# Patient Record
Sex: FEMALE | Race: WHITE | NOT HISPANIC OR LATINO | Employment: UNEMPLOYED | URBAN - METROPOLITAN AREA
[De-identification: names, ages, dates, MRNs, and addresses within clinical notes are randomized per-mention and may not be internally consistent; named-entity substitution may affect disease eponyms.]

---

## 2017-10-18 ENCOUNTER — HISTORICAL (OUTPATIENT)
Dept: LAB | Facility: HOSPITAL | Age: 27
End: 2017-10-18

## 2017-12-01 ENCOUNTER — HISTORICAL (OUTPATIENT)
Dept: LAB | Facility: HOSPITAL | Age: 27
End: 2017-12-01

## 2018-01-03 ENCOUNTER — HISTORICAL (OUTPATIENT)
Dept: LAB | Facility: HOSPITAL | Age: 28
End: 2018-01-03

## 2018-01-16 ENCOUNTER — HOSPITAL ENCOUNTER (OUTPATIENT)
Dept: OBSTETRICS AND GYNECOLOGY | Facility: HOSPITAL | Age: 28
End: 2018-01-16
Attending: OBSTETRICS & GYNECOLOGY | Admitting: OBSTETRICS & GYNECOLOGY

## 2018-02-19 ENCOUNTER — HISTORICAL (OUTPATIENT)
Dept: LAB | Facility: HOSPITAL | Age: 28
End: 2018-02-19

## 2018-03-14 ENCOUNTER — HISTORICAL (OUTPATIENT)
Dept: LAB | Facility: HOSPITAL | Age: 28
End: 2018-03-14

## 2018-04-25 ENCOUNTER — HISTORICAL (OUTPATIENT)
Dept: LAB | Facility: HOSPITAL | Age: 28
End: 2018-04-25

## 2018-04-26 ENCOUNTER — HISTORICAL (OUTPATIENT)
Dept: LAB | Facility: HOSPITAL | Age: 28
End: 2018-04-26

## 2018-05-04 ENCOUNTER — HOSPITAL ENCOUNTER (OUTPATIENT)
Dept: OBSTETRICS AND GYNECOLOGY | Facility: HOSPITAL | Age: 28
End: 2018-05-04
Attending: OBSTETRICS & GYNECOLOGY | Admitting: OBSTETRICS & GYNECOLOGY

## 2018-08-29 ENCOUNTER — HISTORICAL (OUTPATIENT)
Dept: LAB | Facility: HOSPITAL | Age: 28
End: 2018-08-29

## 2019-01-29 ENCOUNTER — HISTORICAL (OUTPATIENT)
Dept: LAB | Facility: HOSPITAL | Age: 29
End: 2019-01-29

## 2020-03-24 ENCOUNTER — HISTORICAL (OUTPATIENT)
Dept: ADMINISTRATIVE | Facility: HOSPITAL | Age: 30
End: 2020-03-24

## 2020-07-14 ENCOUNTER — HISTORICAL (OUTPATIENT)
Dept: OBSTETRICS AND GYNECOLOGY | Facility: HOSPITAL | Age: 30
End: 2020-07-14

## 2020-07-15 ENCOUNTER — HISTORICAL (OUTPATIENT)
Dept: OBSTETRICS AND GYNECOLOGY | Facility: HOSPITAL | Age: 30
End: 2020-07-15

## 2022-03-24 ENCOUNTER — HISTORICAL (OUTPATIENT)
Dept: ADMINISTRATIVE | Facility: HOSPITAL | Age: 32
End: 2022-03-24

## 2022-03-24 LAB
HBV SURFACE AG SERPL QL IA: NEGATIVE
HCV AB SERPL QL IA: NEGATIVE
HIV 1+2 AB+HIV1 P24 AG SERPL QL IA: NEGATIVE
RPR: NONREACTIVE
RUBELLA IMMUNE STATUS: NORMAL

## 2022-03-30 DIAGNOSIS — Z36.89 ENCOUNTER FOR FETAL ANATOMIC SURVEY: Primary | ICD-10-CM

## 2022-04-05 ENCOUNTER — PROCEDURE VISIT (OUTPATIENT)
Dept: MATERNAL FETAL MEDICINE | Facility: CLINIC | Age: 32
End: 2022-04-05
Payer: MEDICAID

## 2022-04-05 DIAGNOSIS — Z36.89 ENCOUNTER FOR FETAL ANATOMIC SURVEY: ICD-10-CM

## 2022-04-05 PROCEDURE — 76805 US MFM PROCEDURE (VIEWPOINT): ICD-10-PCS | Mod: S$GLB,,, | Performed by: OBSTETRICS & GYNECOLOGY

## 2022-04-05 PROCEDURE — 76805 OB US >/= 14 WKS SNGL FETUS: CPT | Mod: S$GLB,,, | Performed by: OBSTETRICS & GYNECOLOGY

## 2022-04-07 ENCOUNTER — HISTORICAL (OUTPATIENT)
Dept: ADMINISTRATIVE | Facility: HOSPITAL | Age: 32
End: 2022-04-07
Payer: MEDICAID

## 2022-04-24 VITALS
HEIGHT: 63 IN | DIASTOLIC BLOOD PRESSURE: 75 MMHG | BODY MASS INDEX: 22.79 KG/M2 | SYSTOLIC BLOOD PRESSURE: 113 MMHG | WEIGHT: 128.63 LBS

## 2022-04-27 DIAGNOSIS — Z36.89 ENCOUNTER FOR FETAL ANATOMIC SURVEY: Primary | ICD-10-CM

## 2022-05-10 ENCOUNTER — PROCEDURE VISIT (OUTPATIENT)
Dept: MATERNAL FETAL MEDICINE | Facility: CLINIC | Age: 32
End: 2022-05-10
Payer: MEDICAID

## 2022-05-10 DIAGNOSIS — Z36.89 ENCOUNTER FOR FETAL ANATOMIC SURVEY: ICD-10-CM

## 2022-05-10 PROCEDURE — 76816 US MFM PROCEDURE (VIEWPOINT): ICD-10-PCS | Mod: S$GLB,,, | Performed by: OBSTETRICS & GYNECOLOGY

## 2022-05-10 PROCEDURE — 76816 OB US FOLLOW-UP PER FETUS: CPT | Mod: S$GLB,,, | Performed by: OBSTETRICS & GYNECOLOGY

## 2022-05-10 PROCEDURE — 76819 US MFM PROCEDURE (VIEWPOINT): ICD-10-PCS | Mod: S$GLB,,, | Performed by: OBSTETRICS & GYNECOLOGY

## 2022-05-10 PROCEDURE — 76819 FETAL BIOPHYS PROFIL W/O NST: CPT | Mod: S$GLB,,, | Performed by: OBSTETRICS & GYNECOLOGY

## 2022-05-17 ENCOUNTER — OFFICE VISIT (OUTPATIENT)
Dept: FAMILY MEDICINE | Facility: CLINIC | Age: 32
End: 2022-05-17
Payer: MEDICAID

## 2022-05-17 DIAGNOSIS — F31.75 BIPOLAR 1 DISORDER, DEPRESSED, PARTIAL REMISSION: ICD-10-CM

## 2022-05-17 DIAGNOSIS — F41.1 GAD (GENERALIZED ANXIETY DISORDER): Primary | ICD-10-CM

## 2022-05-17 PROCEDURE — 99205 PR OFFICE/OUTPT VISIT, NEW, LEVL V, 60-74 MIN: ICD-10-PCS | Mod: 95,,, | Performed by: NURSE PRACTITIONER

## 2022-05-17 PROCEDURE — 99205 OFFICE O/P NEW HI 60 MIN: CPT | Mod: 95,,, | Performed by: NURSE PRACTITIONER

## 2022-05-17 NOTE — PATIENT INSTRUCTIONS
Sent patient education     Meds as directed  Keep all healthcare appointments  Utilize self-interventions from patient education materials  Nearest ER if overwhelmed   Clinic 4 weeks
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
## 2022-05-18 ENCOUNTER — HOSPITAL ENCOUNTER (INPATIENT)
Facility: HOSPITAL | Age: 32
LOS: 2 days | Discharge: HOME | End: 2022-05-20
Attending: OBSTETRICS & GYNECOLOGY | Admitting: OBSTETRICS & GYNECOLOGY
Payer: MEDICAID

## 2022-05-18 ENCOUNTER — ANESTHESIA EVENT (OUTPATIENT)
Dept: OBSTETRICS AND GYNECOLOGY | Facility: HOSPITAL | Age: 32
End: 2022-05-18
Payer: MEDICAID

## 2022-05-18 ENCOUNTER — ANESTHESIA (OUTPATIENT)
Dept: OBSTETRICS AND GYNECOLOGY | Facility: HOSPITAL | Age: 32
End: 2022-05-18
Payer: MEDICAID

## 2022-05-18 DIAGNOSIS — O47.00 PRETERM CONTRACTIONS: ICD-10-CM

## 2022-05-18 DIAGNOSIS — Z36.89 ENCOUNTER FOR ULTRASOUND TO ASSESS FETAL GROWTH: Primary | ICD-10-CM

## 2022-05-18 LAB
AMPHET UR QL SCN: NEGATIVE
BARBITURATE SCN PRESENT UR: NEGATIVE
BASOPHILS # BLD AUTO: 0.03 X10(3)/MCL (ref 0–0.2)
BASOPHILS NFR BLD AUTO: 0.3 %
BENZODIAZ UR QL SCN: NEGATIVE
CANNABINOIDS UR QL SCN: NEGATIVE
COCAINE UR QL SCN: NEGATIVE
EOSINOPHIL # BLD AUTO: 0.28 X10(3)/MCL (ref 0–0.9)
EOSINOPHIL NFR BLD AUTO: 2.4 %
ERYTHROCYTE [DISTWIDTH] IN BLOOD BY AUTOMATED COUNT: 13 % (ref 11.5–17)
FENTANYL UR QL SCN: NEGATIVE
HCT VFR BLD AUTO: 40.8 % (ref 37–47)
HGB BLD-MCNC: 13.2 GM/DL (ref 12–16)
IMM GRANULOCYTES # BLD AUTO: 0.07 X10(3)/MCL (ref 0–0.02)
IMM GRANULOCYTES NFR BLD AUTO: 0.6 % (ref 0–0.43)
LYMPHOCYTES # BLD AUTO: 3.05 X10(3)/MCL (ref 0.6–4.6)
LYMPHOCYTES NFR BLD AUTO: 26.1 %
MCH RBC QN AUTO: 30.6 PG (ref 27–31)
MCHC RBC AUTO-ENTMCNC: 32.4 MG/DL (ref 33–36)
MCV RBC AUTO: 94.7 FL (ref 80–94)
MDMA UR QL SCN: NEGATIVE
MONOCYTES # BLD AUTO: 0.55 X10(3)/MCL (ref 0.1–1.3)
MONOCYTES NFR BLD AUTO: 4.7 %
NEUTROPHILS # BLD AUTO: 7.7 X10(3)/MCL (ref 2.1–9.2)
NEUTROPHILS NFR BLD AUTO: 65.9 %
NRBC BLD AUTO-RTO: 0 %
OPIATES UR QL SCN: NEGATIVE
PCP UR QL: NEGATIVE
PH UR: 6 [PH] (ref 3–11)
PLATELET # BLD AUTO: 246 X10(3)/MCL (ref 130–400)
PMV BLD AUTO: 12.1 FL (ref 9.4–12.4)
PRENATAL STREP B CULTURE: NORMAL
RBC # BLD AUTO: 4.31 X10(6)/MCL (ref 4.2–5.4)
SPECIFIC GRAVITY, URINE AUTO (.000) (OHS): <1.005 (ref 1–1.03)
STREP B PCR (OHS): NOT DETECTED
T PALLIDUM AB SER QL: NONREACTIVE
T PALLIDUM AB SER QL: NORMAL
WBC # SPEC AUTO: 11.7 X10(3)/MCL (ref 4.5–11.5)

## 2022-05-18 PROCEDURE — 86850 RBC ANTIBODY SCREEN: CPT | Performed by: OBSTETRICS & GYNECOLOGY

## 2022-05-18 PROCEDURE — 37000009 HC ANESTHESIA EA ADD 15 MINS

## 2022-05-18 PROCEDURE — 72200004 HC VAGINAL DELIVERY LEVEL I

## 2022-05-18 PROCEDURE — 72100002 HC LABOR CARE, 1ST 8 HOURS

## 2022-05-18 PROCEDURE — 25000003 PHARM REV CODE 250: Performed by: OBSTETRICS & GYNECOLOGY

## 2022-05-18 PROCEDURE — 86780 TREPONEMA PALLIDUM: CPT | Performed by: OBSTETRICS & GYNECOLOGY

## 2022-05-18 PROCEDURE — 51702 INSERT TEMP BLADDER CATH: CPT

## 2022-05-18 PROCEDURE — 37000008 HC ANESTHESIA 1ST 15 MINUTES

## 2022-05-18 PROCEDURE — 63600175 PHARM REV CODE 636 W HCPCS: Performed by: OBSTETRICS & GYNECOLOGY

## 2022-05-18 PROCEDURE — 11000001 HC ACUTE MED/SURG PRIVATE ROOM

## 2022-05-18 PROCEDURE — 85025 COMPLETE CBC W/AUTO DIFF WBC: CPT | Performed by: OBSTETRICS & GYNECOLOGY

## 2022-05-18 PROCEDURE — 86900 BLOOD TYPING SEROLOGIC ABO: CPT | Performed by: OBSTETRICS & GYNECOLOGY

## 2022-05-18 PROCEDURE — 99285 EMERGENCY DEPT VISIT HI MDM: CPT | Mod: 25

## 2022-05-18 PROCEDURE — 36415 COLL VENOUS BLD VENIPUNCTURE: CPT | Performed by: OBSTETRICS & GYNECOLOGY

## 2022-05-18 PROCEDURE — 96372 THER/PROPH/DIAG INJ SC/IM: CPT | Performed by: OBSTETRICS & GYNECOLOGY

## 2022-05-18 PROCEDURE — 80307 DRUG TEST PRSMV CHEM ANLYZR: CPT | Performed by: OBSTETRICS & GYNECOLOGY

## 2022-05-18 PROCEDURE — 87653 STREP B DNA AMP PROBE: CPT | Performed by: OBSTETRICS & GYNECOLOGY

## 2022-05-18 PROCEDURE — 62326 NJX INTERLAMINAR LMBR/SAC: CPT | Performed by: ANESTHESIOLOGY

## 2022-05-18 PROCEDURE — 72200005 HC VAGINAL DELIVERY LEVEL II

## 2022-05-18 PROCEDURE — 25000003 PHARM REV CODE 250: Performed by: ANESTHESIOLOGY

## 2022-05-18 RX ORDER — FOLIC ACID 0.4 MG
0.4 TABLET ORAL
COMMUNITY
Start: 2022-03-24 | End: 2023-05-07

## 2022-05-18 RX ORDER — IBUPROFEN 600 MG/1
600 TABLET ORAL EVERY 6 HOURS PRN
Status: DISCONTINUED | OUTPATIENT
Start: 2022-05-19 | End: 2022-05-18

## 2022-05-18 RX ORDER — IBUPROFEN 600 MG/1
600 TABLET ORAL EVERY 6 HOURS
Status: DISCONTINUED | OUTPATIENT
Start: 2022-05-18 | End: 2022-05-20 | Stop reason: HOSPADM

## 2022-05-18 RX ORDER — SODIUM CHLORIDE, SODIUM LACTATE, POTASSIUM CHLORIDE, CALCIUM CHLORIDE 600; 310; 30; 20 MG/100ML; MG/100ML; MG/100ML; MG/100ML
INJECTION, SOLUTION INTRAVENOUS CONTINUOUS
Status: DISCONTINUED | OUTPATIENT
Start: 2022-05-18 | End: 2022-05-20 | Stop reason: HOSPADM

## 2022-05-18 RX ORDER — SODIUM CHLORIDE 0.9 % (FLUSH) 0.9 %
10 SYRINGE (ML) INJECTION
Status: DISCONTINUED | OUTPATIENT
Start: 2022-05-18 | End: 2022-05-20 | Stop reason: HOSPADM

## 2022-05-18 RX ORDER — PROCHLORPERAZINE EDISYLATE 5 MG/ML
5 INJECTION INTRAMUSCULAR; INTRAVENOUS EVERY 6 HOURS PRN
Status: DISCONTINUED | OUTPATIENT
Start: 2022-05-18 | End: 2022-05-20 | Stop reason: HOSPADM

## 2022-05-18 RX ORDER — MUPIROCIN 20 MG/G
OINTMENT TOPICAL
Status: CANCELLED | OUTPATIENT
Start: 2022-05-18

## 2022-05-18 RX ORDER — TERBUTALINE SULFATE 1 MG/ML
INJECTION SUBCUTANEOUS
Status: DISPENSED
Start: 2022-05-18 | End: 2022-05-19

## 2022-05-18 RX ORDER — LURASIDONE HYDROCHLORIDE 20 MG/1
20 TABLET, FILM COATED ORAL DAILY
COMMUNITY
Start: 2022-03-24

## 2022-05-18 RX ORDER — ONDANSETRON 2 MG/ML
4 INJECTION INTRAMUSCULAR; INTRAVENOUS EVERY 4 HOURS PRN
Status: DISCONTINUED | OUTPATIENT
Start: 2022-05-18 | End: 2022-05-20 | Stop reason: HOSPADM

## 2022-05-18 RX ORDER — BUPIVACAINE HYDROCHLORIDE 2.5 MG/ML
INJECTION, SOLUTION EPIDURAL; INFILTRATION; INTRACAUDAL
Status: DISPENSED
Start: 2022-05-18 | End: 2022-05-19

## 2022-05-18 RX ORDER — TERBUTALINE SULFATE 1 MG/ML
0.25 INJECTION SUBCUTANEOUS ONCE
Status: DISCONTINUED | OUTPATIENT
Start: 2022-05-18 | End: 2022-05-20 | Stop reason: HOSPADM

## 2022-05-18 RX ORDER — DIPHENOXYLATE HYDROCHLORIDE AND ATROPINE SULFATE 2.5; .025 MG/1; MG/1
1 TABLET ORAL 4 TIMES DAILY PRN
Status: DISCONTINUED | OUTPATIENT
Start: 2022-05-18 | End: 2022-05-20 | Stop reason: HOSPADM

## 2022-05-18 RX ORDER — ONDANSETRON 4 MG/1
8 TABLET, ORALLY DISINTEGRATING ORAL EVERY 8 HOURS PRN
Status: DISCONTINUED | OUTPATIENT
Start: 2022-05-18 | End: 2022-05-20 | Stop reason: HOSPADM

## 2022-05-18 RX ORDER — LIDOCAINE HYDROCHLORIDE 10 MG/ML
10 INJECTION INFILTRATION; PERINEURAL ONCE AS NEEDED
Status: DISCONTINUED | OUTPATIENT
Start: 2022-05-18 | End: 2022-05-20 | Stop reason: HOSPADM

## 2022-05-18 RX ORDER — PENICILLIN G 3000000 [IU]/50ML
3 INJECTION, SOLUTION INTRAVENOUS
Status: DISCONTINUED | OUTPATIENT
Start: 2022-05-18 | End: 2022-05-20 | Stop reason: HOSPADM

## 2022-05-18 RX ORDER — FENTANYL/BUPIVACAINE/NS/PF 2-1250MCG
PLASTIC BAG, INJECTION (ML) INJECTION CONTINUOUS PRN
Status: DISCONTINUED | OUTPATIENT
Start: 2022-05-18 | End: 2022-05-18

## 2022-05-18 RX ORDER — CARBOPROST TROMETHAMINE 250 UG/ML
250 INJECTION, SOLUTION INTRAMUSCULAR
Status: DISCONTINUED | OUTPATIENT
Start: 2022-05-18 | End: 2022-05-20 | Stop reason: HOSPADM

## 2022-05-18 RX ORDER — OXYTOCIN/RINGER'S LACTATE 30/500 ML
PLASTIC BAG, INJECTION (ML) INTRAVENOUS
Status: DISPENSED
Start: 2022-05-18 | End: 2022-05-19

## 2022-05-18 RX ORDER — OXYTOCIN/RINGER'S LACTATE 30/500 ML
334 PLASTIC BAG, INJECTION (ML) INTRAVENOUS ONCE
Status: COMPLETED | OUTPATIENT
Start: 2022-05-18 | End: 2022-05-18

## 2022-05-18 RX ORDER — MISOPROSTOL 100 UG/1
800 TABLET ORAL
Status: DISCONTINUED | OUTPATIENT
Start: 2022-05-18 | End: 2022-05-20 | Stop reason: HOSPADM

## 2022-05-18 RX ORDER — CALCIUM CARBONATE 200(500)MG
500 TABLET,CHEWABLE ORAL 3 TIMES DAILY PRN
Status: DISCONTINUED | OUTPATIENT
Start: 2022-05-18 | End: 2022-05-20 | Stop reason: HOSPADM

## 2022-05-18 RX ORDER — BETAMETHASONE SODIUM PHOSPHATE AND BETAMETHASONE ACETATE 3; 3 MG/ML; MG/ML
12 INJECTION, SUSPENSION INTRA-ARTICULAR; INTRALESIONAL; INTRAMUSCULAR; SOFT TISSUE
Status: COMPLETED | OUTPATIENT
Start: 2022-05-18 | End: 2022-05-18

## 2022-05-18 RX ORDER — OXYTOCIN/RINGER'S LACTATE 30/500 ML
95 PLASTIC BAG, INJECTION (ML) INTRAVENOUS ONCE
Status: COMPLETED | OUTPATIENT
Start: 2022-05-18 | End: 2022-05-18

## 2022-05-18 RX ORDER — SIMETHICONE 80 MG
1 TABLET,CHEWABLE ORAL 4 TIMES DAILY PRN
Status: DISCONTINUED | OUTPATIENT
Start: 2022-05-18 | End: 2022-05-20 | Stop reason: HOSPADM

## 2022-05-18 RX ORDER — METHYLERGONOVINE MALEATE 0.2 MG/ML
200 INJECTION INTRAVENOUS
Status: DISCONTINUED | OUTPATIENT
Start: 2022-05-18 | End: 2022-05-20 | Stop reason: HOSPADM

## 2022-05-18 RX ADMIN — DEXTROSE MONOHYDRATE 5 MILLION UNITS: 5 INJECTION INTRAVENOUS at 06:05

## 2022-05-18 RX ADMIN — BETAMETHASONE ACETATE AND BETAMETHASONE SODIUM PHOSPHATE 12 MG: 3; 3 INJECTION, SUSPENSION INTRA-ARTICULAR; INTRALESIONAL; INTRAMUSCULAR; SOFT TISSUE at 01:05

## 2022-05-18 RX ADMIN — Medication 10 ML/HR: at 07:05

## 2022-05-18 RX ADMIN — Medication 95 MILLI-UNITS/MIN: at 10:05

## 2022-05-18 RX ADMIN — Medication 334 MILLI-UNITS/MIN: at 10:05

## 2022-05-18 NOTE — ED PROVIDER NOTES
"CAROLYN NOTE     Admit Date: 2022  CAROLYN Physician: Martínez Chapman  Primary OBGYN:     Admit Diagnosis/Chief Complaint: Contractions      Chief Complaint   Patient presents with    Abdominal Pain       Hospital Course:  Barbara Mejia is a 31 y.o.  at 35w0d presents complaining of contractions  This IUP is complicated by history of  delivery, anxiety and bipolar    Patient denies vaginal bleeding and leakage of fluid.  Fetal Movement: normal.    /78 (BP Location: Left arm, Patient Position: Lying)   Resp 18   Ht 5' 3" (1.6 m)   Wt 62.6 kg (138 lb)   LMP  (LMP Unknown)   BMI 24.45 kg/m²   Temp:  [97 °F (36.1 °C)] 97 °F (36.1 °C)  Pulse:  [95] 95  Resp:  [18] 18  BP: (117-125)/(78-83) 125/78    General: in no apparent distress  Abdominal: soft, nontender, nondistended, no abnormal masses, no epigastric pain fundus soft, nontender 35 weeks size FHT present  Back: lumbar tenderness absent   CVA tenderness none  Extremeties no redness or tenderness in the calves or thighs no edema    SSE:   SVE:  5cm     FHT:  reactive  TOCO: irregular      LABS:   No results found for this or any previous visit (from the past 24 hour(s)).  [unfilled]     Imaging Results    None          ASSESMENT: Barbara Mejia is a 31 y.o.   at 35w0d with Contractions  Place under overnight observation  IV fluids  Terbutaline  Fetal lung maturity steroids   Labor precautions reviewed with patient  ER precautions reviewed with patient  Patient was given an opportunity to ask questions  Patient is to follow-up with her primary care physician  Patient currently stable      Discharge Diagnosis:  Admit overnight observation    Status:Stable        "

## 2022-05-18 NOTE — PROGRESS NOTES
Labor Progress Note        Subjective:      Patient currently complaining of contraction pain.     Objective:      Temp:  [97 °F (36.1 °C)] 97 °F (36.1 °C)  Pulse:  [93-95] 93  Resp:  [18] 18  BP: (117-125)/(78-85) 119/85  Body mass index is 24.45 kg/m².     General: no acute distress  Electronic Fetal Monitoring:  FHT: 140 bpm, moderate variability, accelerations absent, decelerations absent   Category: 1                 TOCO: Contractions: irregular, every 7-10 minutes   Cervix:7cm     Assessment:     1. IUP at  @35wk 0 day here for spontaneous labor  2.   Group & Rh   Date Value Ref Range Status   2019 A POS  Final     3. Category 1 FHT     Plan:     1. Continue expectant management  2. GBS proph  3. Reassuring FHT     Martínez Chapman

## 2022-05-18 NOTE — ANESTHESIA PREPROCEDURE EVALUATION
05/18/2022  Barbara Mejia is a 31 y.o., female.      Pre-op Assessment    I have reviewed the Patient Summary Reports.     I have reviewed the Nursing Notes. I have reviewed the NPO Status.   I have reviewed the Medications.     Review of Systems  Psych:  Anxiety Disorder and Chronic Anxiety Disorder.          Physical Exam  General: Well nourished    Airway:  Mallampati: I / I  Mouth Opening: Normal  TM Distance: Normal  Neck ROM: Normal ROM    Dental:  Intact        Anesthesia Plan  Type of Anesthesia, risks & benefits discussed:    Anesthesia Type: Epidural  Intra-op Monitoring Plan: Standard ASA Monitors  Post Op Pain Control Plan: IV/PO Opioids PRN  ASA Score: 2    Ready For Surgery From Anesthesia Perspective.     .

## 2022-05-19 LAB
GROUP & RH: NORMAL
INDIRECT COOMBS GEL: NORMAL

## 2022-05-19 PROCEDURE — 25000003 PHARM REV CODE 250: Performed by: STUDENT IN AN ORGANIZED HEALTH CARE EDUCATION/TRAINING PROGRAM

## 2022-05-19 PROCEDURE — 36415 COLL VENOUS BLD VENIPUNCTURE: CPT | Performed by: OBSTETRICS & GYNECOLOGY

## 2022-05-19 PROCEDURE — 11000001 HC ACUTE MED/SURG PRIVATE ROOM

## 2022-05-19 PROCEDURE — 63600175 PHARM REV CODE 636 W HCPCS: Performed by: OBSTETRICS & GYNECOLOGY

## 2022-05-19 PROCEDURE — 25000003 PHARM REV CODE 250: Performed by: OBSTETRICS & GYNECOLOGY

## 2022-05-19 PROCEDURE — 86850 RBC ANTIBODY SCREEN: CPT | Performed by: OBSTETRICS & GYNECOLOGY

## 2022-05-19 RX ORDER — HYDROCORTISONE 25 MG/G
CREAM TOPICAL 3 TIMES DAILY PRN
Status: DISCONTINUED | OUTPATIENT
Start: 2022-05-19 | End: 2022-05-20 | Stop reason: HOSPADM

## 2022-05-19 RX ORDER — ACETAMINOPHEN 325 MG/1
650 TABLET ORAL EVERY 6 HOURS PRN
Status: DISCONTINUED | OUTPATIENT
Start: 2022-05-19 | End: 2022-05-20 | Stop reason: HOSPADM

## 2022-05-19 RX ORDER — KETOROLAC TROMETHAMINE 30 MG/ML
30 INJECTION, SOLUTION INTRAMUSCULAR; INTRAVENOUS EVERY 8 HOURS
Status: DISPENSED | OUTPATIENT
Start: 2022-05-19 | End: 2022-05-20

## 2022-05-19 RX ORDER — PRENATAL WITH FERROUS FUM AND FOLIC ACID 3080; 920; 120; 400; 22; 1.84; 3; 20; 10; 1; 12; 200; 27; 25; 2 [IU]/1; [IU]/1; MG/1; [IU]/1; MG/1; MG/1; MG/1; MG/1; MG/1; MG/1; UG/1; MG/1; MG/1; MG/1; MG/1
1 TABLET ORAL DAILY
Status: DISCONTINUED | OUTPATIENT
Start: 2022-05-19 | End: 2022-05-20 | Stop reason: HOSPADM

## 2022-05-19 RX ORDER — HYDROCODONE BITARTRATE AND ACETAMINOPHEN 5; 325 MG/1; MG/1
1 TABLET ORAL EVERY 4 HOURS PRN
Status: DISCONTINUED | OUTPATIENT
Start: 2022-05-19 | End: 2022-05-20 | Stop reason: HOSPADM

## 2022-05-19 RX ORDER — HYDROCODONE BITARTRATE AND ACETAMINOPHEN 7.5; 325 MG/1; MG/1
1 TABLET ORAL EVERY 4 HOURS PRN
Status: DISCONTINUED | OUTPATIENT
Start: 2022-05-19 | End: 2022-05-20 | Stop reason: HOSPADM

## 2022-05-19 RX ORDER — OXYTOCIN/RINGER'S LACTATE 30/500 ML
95 PLASTIC BAG, INJECTION (ML) INTRAVENOUS ONCE
Status: DISCONTINUED | OUTPATIENT
Start: 2022-05-19 | End: 2022-05-20 | Stop reason: HOSPADM

## 2022-05-19 RX ORDER — SIMETHICONE 80 MG
1 TABLET,CHEWABLE ORAL EVERY 6 HOURS PRN
Status: DISCONTINUED | OUTPATIENT
Start: 2022-05-19 | End: 2022-05-20 | Stop reason: HOSPADM

## 2022-05-19 RX ORDER — LURASIDONE HYDROCHLORIDE 20 MG/1
20 TABLET, FILM COATED ORAL DAILY
Status: DISCONTINUED | OUTPATIENT
Start: 2022-05-19 | End: 2022-05-20 | Stop reason: HOSPADM

## 2022-05-19 RX ORDER — KETOROLAC TROMETHAMINE 30 MG/ML
30 INJECTION, SOLUTION INTRAMUSCULAR; INTRAVENOUS ONCE
Status: COMPLETED | OUTPATIENT
Start: 2022-05-19 | End: 2022-05-19

## 2022-05-19 RX ORDER — DIPHENHYDRAMINE HCL 25 MG
25 CAPSULE ORAL EVERY 4 HOURS PRN
Status: DISCONTINUED | OUTPATIENT
Start: 2022-05-19 | End: 2022-05-20 | Stop reason: HOSPADM

## 2022-05-19 RX ORDER — DOCUSATE SODIUM 100 MG/1
200 CAPSULE, LIQUID FILLED ORAL 2 TIMES DAILY PRN
Status: DISCONTINUED | OUTPATIENT
Start: 2022-05-19 | End: 2022-05-20 | Stop reason: HOSPADM

## 2022-05-19 RX ORDER — DIPHENHYDRAMINE HYDROCHLORIDE 50 MG/ML
25 INJECTION INTRAMUSCULAR; INTRAVENOUS EVERY 4 HOURS PRN
Status: DISCONTINUED | OUTPATIENT
Start: 2022-05-19 | End: 2022-05-20 | Stop reason: HOSPADM

## 2022-05-19 RX ORDER — KETOROLAC TROMETHAMINE 30 MG/ML
INJECTION, SOLUTION INTRAMUSCULAR; INTRAVENOUS
Status: DISPENSED
Start: 2022-05-19 | End: 2022-05-19

## 2022-05-19 RX ADMIN — HYDROCODONE BITARTRATE AND ACETAMINOPHEN 1 TABLET: 5; 325 TABLET ORAL at 12:05

## 2022-05-19 RX ADMIN — IBUPROFEN 600 MG: 600 TABLET ORAL at 11:05

## 2022-05-19 RX ADMIN — DOCUSATE SODIUM 200 MG: 100 CAPSULE, LIQUID FILLED ORAL at 10:05

## 2022-05-19 RX ADMIN — PRENATAL VITAMINS-IRON FUMARATE 27 MG IRON-FOLIC ACID 0.8 MG TABLET 1 TABLET: at 10:05

## 2022-05-19 RX ADMIN — HYDROCODONE BITARTRATE AND ACETAMINOPHEN 1 TABLET: 7.5; 325 TABLET ORAL at 08:05

## 2022-05-19 RX ADMIN — DOCUSATE SODIUM 200 MG: 100 CAPSULE, LIQUID FILLED ORAL at 12:05

## 2022-05-19 RX ADMIN — SIMETHICONE 80 MG: 80 TABLET, CHEWABLE ORAL at 01:05

## 2022-05-19 RX ADMIN — KETOROLAC TROMETHAMINE 30 MG: 30 INJECTION, SOLUTION INTRAMUSCULAR at 04:05

## 2022-05-19 RX ADMIN — IBUPROFEN 600 MG: 600 TABLET ORAL at 05:05

## 2022-05-19 RX ADMIN — HYDROCODONE BITARTRATE AND ACETAMINOPHEN 1 TABLET: 5; 325 TABLET ORAL at 07:05

## 2022-05-19 RX ADMIN — HYDROCODONE BITARTRATE AND ACETAMINOPHEN 1 TABLET: 5; 325 TABLET ORAL at 04:05

## 2022-05-19 RX ADMIN — KETOROLAC TROMETHAMINE 30 MG: 30 INJECTION, SOLUTION INTRAMUSCULAR at 02:05

## 2022-05-19 RX ADMIN — IBUPROFEN 600 MG: 600 TABLET ORAL at 12:05

## 2022-05-19 RX ADMIN — HYDROCODONE BITARTRATE AND ACETAMINOPHEN 1 TABLET: 5; 325 TABLET ORAL at 11:05

## 2022-05-19 NOTE — ANESTHESIA POSTPROCEDURE EVALUATION
Anesthesia Post Evaluation    Patient: Barbara Mejia    Procedure(s) Performed: * No procedures listed *    Final Anesthesia Type: epidural      Patient location during evaluation: PACU  Patient participation: Yes- Able to Participate  Level of consciousness: awake and alert  Post-procedure vital signs: reviewed and stable  Pain management: adequate  Airway patency: patent  SOTO mitigation strategies: Use of major conduction anesthesia (spinal/epidural) or peripheral nerve block  PONV status at discharge: No PONV  Anesthetic complications: no      Cardiovascular status: hemodynamically stable  Respiratory status: unassisted, spontaneous ventilation and room air  Hydration status: euvolemic  Follow-up not needed.          Vitals Value Taken Time   /71 05/19/22 0855   Temp 36.7 °C (98 °F) 05/19/22 1123   Pulse 70 05/19/22 0855   Resp 18 05/19/22 1122   SpO2 99 % 05/18/22 2202   Vitals shown include unvalidated device data.      No case tracking events are documented in the log.      Pain/Dat Score: Pain Rating Prior to Med Admin: 10 (5/19/2022 11:23 AM)  Pain Rating Post Med Admin: 6 (5/19/2022  1:00 AM)

## 2022-05-19 NOTE — PROGRESS NOTES
.. Admit Assessment    Patient Identification  Barbara Mejia   :  1990  Admit Date:  2022  Attending Provider:  Martínez Chapman MD              Referral:    received case management consult for meconium drug screen assessment.    I met with mom, Barbara Mejia, in her post-partum room. Mom presented appropriate and was cooperative. Baby Megan christopher was born via Vag. Delivery at 35 WGA weighing 4lbs 11 oz.. Verified her face sheet information:      Living Situation:      Resides at [address] [place] [address],([facility]) phone: [phone] (home).       Assessment narrative here: Mom reported she has 4 other children at home, ages: 9, 7, 4, and 1 (all girls). Her 2 y/o has been living with family until she delivered baby and became adjusted at home with . Mom reported this FOB is not involved and she did not provide name or additional information. Mom reported her own mother will provide her with adequate discharge transportation. 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 Mom reported to having all necessary supplies; including a car seat and crib. We thoroughly reviewed car seat safety and safe sleep and mom verbally expressed her understanding. I provided mom with extra literature. Mom reported she has plans to breast feed and formula feed and expressed some concern about the formula shortage. Mom and I discussed options and she reported she currently receives food stamps and WIC. Mom had limited prenatal care with Dr. Chapman and reported in the beginning trimesters, she attended clinic in [place], [place] were she was residing before she relocated to [place]. Mom reported baby will attend [facility] for pediatric care. Mom denied having additional questions or concerns at this time.       History/Current  Symptoms of Anxiety/Depression: Bipolar 1 and ARI (Generalized Anxiety Disorder). Mom reported to a hx of PPD in the past.; mom currently has mental health services with [facility] outpatient behavioral clinic.   Discussed PPD and identifying symptoms and provided mom with PPD counseling resources and symptom brochure.       Identified Support:  Her children's fathers      History/Current Substance Use: Mom reported she smoked marijuana years ago and denied any current use.      Indications of Abuse/Neglect:  Denied         Emotional/Behavioral/Cognitive Issues: None presented      Current RX Prescriptions: Latuda     Adequate Discharge Transportation: Yes     Mom's UDS:negative    Baby's UDS: Negative    DCFS status: MDS pending, I thoroughly explained to mom our mandating reporting policy; mom verbally expressed her understanding. I will follow MDS and file report if necessary.      Plan:     Patient/caregiver engaged in treatment planning process.      providing psychosocial and supportive counseling, resources, education, assistance and discharge planning as appropriate.  Patient/caregiver state understanding of  available resources,  following, remains available.        Patient/Caregiver informed of right to choose providers or agencies.  Patient/Caregiver provides permission to release any necessary information to [facility] and to Non-[facility] agencies as needed to facilitate patient care, treatment planning, and patient discharge planning.  Written and verbal resources provided.

## 2022-05-19 NOTE — PLAN OF CARE
Patient arrived to MB suite in pain, would like to rest and control pain. Also stated that she would rather bottle feed as patient does not think she will have time with [age] year old at home. Nervous about formula shortage. Discussed options with mother. Stated she will do both at this time.

## 2022-05-19 NOTE — PROGRESS NOTES
Post-Partum Progress Note        Subjective:      Barbara Mejia is a 31 y.o.  Post-partum Day #1 after .    Nurse reports no acute events overnight.  Patient denies any issues or complaints.  Vaginal bleeding decreasing. Pain is well controlled. Tolerating Full Regular diet. (+) flatus. She is ambulating.  Overall patient is doing well.  No fever, no chills. Pain well controlled with Ibuprofen and Percocet.  Pt reports continued cramping pain and discomfort with urination. Currently breast and bottle feeding.      Objective:      Temp:  [97.9 °F (36.6 °C)-98.4 °F (36.9 °C)] 98 °F (36.7 °C)  Pulse:  [68-93] 70  Resp:  [14-18] 18  SpO2:  [98 %-100 %] 99 %  BP: (105-132)/(56-85) 111/71    Intake/Output Summary (Last 24 hours) at 2022 1248  Last data filed at 2022 0100  Gross per 24 hour   Intake --   Output 1300 ml   Net -1300 ml     Body mass index is 24.45 kg/m².    Gen: AAO x 3, NAD  HEENT: NCAT, EOMI, MMM   CV: RRR, no r/g/m  Resp: Respirations nonlabored, CTAB   Ext: No edema, DP/Radial 2+   Abd: Soft, non distended, no rebound or guarding. + BS.    : Uterus firm below umbilicus    Recent Results (from the past 336 hour(s))   CBC with Differential    Collection Time: 22  6:07 PM   Result Value Ref Range    WBC 11.7 (H) 4.5 - 11.5 x10(3)/mcL    Hgb 13.2 12.0 - 16.0 gm/dL    Hct 40.8 37.0 - 47.0 %    Platelet 246 130 - 400 x10(3)/mcL       Assessment:     31 y.o.  s/p , Post-partum Day #1 - Doing well.     Plan:     1. Continue routine post-partum care  2. Contraception: undecided  3. Rubella Immune, Rh Positive   4. Monitor pain control  5. Will schedule PP f/u with [facility]      Dispo: Progressing as expected. Tentative d/c 22        Abi Gordon M.D.  [facility] HO-1

## 2022-05-19 NOTE — ANESTHESIA PROCEDURE NOTES
Epidural    Patient location during procedure: OB  Block not for primary anesthetic.  Reason for block: labor analgesia requested by patient and obstetrician   Post-op Pain Location: none  Start time: 5/18/2022 7:26 PM  Timeout: 5/18/2022 7:26 PM  End time: 5/18/2022 7:36 PM    Staffing  Performing Provider: Collins Koehler MD  Authorizing Provider: Collins Koehler MD        Preanesthetic Checklist  Completed: patient identified, IV checked, site marked, risks and benefits discussed, surgical consent, monitors and equipment checked, pre-op evaluation, timeout performed, anesthesia consent given, hand hygiene performed and patient being monitored  Preparation  Patient position: sitting Block not for primary anesthetic.  Epidural  Skin Anesthetic: lidocaine 1%  Administration type: continuous  Approach: midline  Interspace: L3-4    Injection technique: ARRON saline  Needle and Epidural Catheter  Catheter type: springwFliptu  Catheter size: 20 G  Catheter at skin depth: 8 cm  Insertion Attempts: 1  Test dose: 3 mL of lidocaine 1.5% with Epi 1-to-200,000  Additional Documentation: no paresthesia on injection, negative aspiration for heme and CSF and no signs/symptoms of IV or SA injection  Needle localization: anatomical landmarks  Assessment  Upper dermatomal levels - Left: T8  Right: T8   Dermatomal levels determined by pinch or prick  Ease of block: easy  Patient's tolerance of the procedure: comfortable throughout block  Additional Notes  Bolused 8ml 0.25% bup, rate 10ml/hr, bolus [id number], max 3x/hr of 0.125% bup with 2mcg/ml fent No inadvertent dural puncture with Tuohy.  Dural puncture not performed with spinal needle

## 2022-05-19 NOTE — OP NOTE
Surgery Date:      2022     MAGGI Chapman MD  Assisting Surgeon: None    Pre-op Diagnosis: 35 wk ctxn in labor    No prenatal care    Post-op Diagnosis: Same delivered        Anesthesia: epidural    Description of the findings of the procedure:     Patient was prepped and draped in a normal sterile fashion. The 2nd stage of labor was within normal limits. The patient's head delivered in NATALYA Infants head delivered without any difficulties along with the shoulders and the rest of the infant. No Nuchal cord and clear fluid noted. Mouth and nose were suctioned on the abdomen. Cord was doubly clamped and cut. The infant was handed to the waiting staff. Cord blood taken. The placenta was extracted spontaneously. There were no perineum and labial lacerations none on the labia. Estimated blood loss was 300 patient tolerated the procedure well and instrument and sponge count were correct patient was to recover in labor room for approximately 1 hour.      Findings/Key Components: live female  Estimated Blood Loss: 300         Specimens:   Specimen (24h ago, onward)            None

## 2022-05-20 VITALS
TEMPERATURE: 98 F | SYSTOLIC BLOOD PRESSURE: 119 MMHG | HEART RATE: 61 BPM | HEIGHT: 63 IN | OXYGEN SATURATION: 99 % | DIASTOLIC BLOOD PRESSURE: 77 MMHG | BODY MASS INDEX: 24.45 KG/M2 | WEIGHT: 138 LBS | RESPIRATION RATE: 16 BRPM

## 2022-05-20 PROBLEM — Z34.90 PREGNANCY: Status: ACTIVE | Noted: 2021-09-24

## 2022-05-20 PROBLEM — O47.00 PRETERM CONTRACTIONS: Status: ACTIVE | Noted: 2022-05-20

## 2022-05-20 PROBLEM — O44.00 PLACENTA PREVIA: Status: RESOLVED | Noted: 2022-03-24 | Resolved: 2022-05-20

## 2022-05-20 PROBLEM — O45.90 PLACENTAL ABRUPTION: Status: ACTIVE | Noted: 2022-03-24

## 2022-05-20 PROBLEM — O60.00 PRETERM LABOR: Status: ACTIVE | Noted: 2022-03-24

## 2022-05-20 PROBLEM — O45.90 PLACENTAL ABRUPTION: Status: RESOLVED | Noted: 2022-03-24 | Resolved: 2022-05-20

## 2022-05-20 PROBLEM — Z86.19 HISTORY OF HERPES GENITALIS: Status: ACTIVE | Noted: 2022-05-20

## 2022-05-20 PROBLEM — O44.00 PLACENTA PREVIA: Status: ACTIVE | Noted: 2022-03-24

## 2022-05-20 PROBLEM — O99.330 MATERNAL TOBACCO USE: Status: ACTIVE | Noted: 2022-03-24

## 2022-05-20 PROBLEM — Z78.9 CAFFEINE USE: Status: ACTIVE | Noted: 2022-03-24

## 2022-05-20 PROBLEM — Z86.69 HISTORY OF SEIZURE DISORDER: Status: ACTIVE | Noted: 2022-05-20

## 2022-05-20 PROBLEM — Z34.90 PREGNANCY: Status: RESOLVED | Noted: 2021-09-24 | Resolved: 2022-05-20

## 2022-05-20 PROCEDURE — 25000003 PHARM REV CODE 250: Performed by: OBSTETRICS & GYNECOLOGY

## 2022-05-20 PROCEDURE — 25000003 PHARM REV CODE 250: Performed by: STUDENT IN AN ORGANIZED HEALTH CARE EDUCATION/TRAINING PROGRAM

## 2022-05-20 RX ORDER — MEDROXYPROGESTERONE ACETATE 150 MG/ML
150 INJECTION, SUSPENSION INTRAMUSCULAR ONCE
Qty: 1 ML | Refills: 0 | Status: SHIPPED | OUTPATIENT
Start: 2022-05-20 | End: 2022-05-20

## 2022-05-20 RX ORDER — HYDROCODONE BITARTRATE AND ACETAMINOPHEN 5; 325 MG/1; MG/1
1 TABLET ORAL EVERY 6 HOURS PRN
Qty: 10 TABLET | Refills: 0 | OUTPATIENT
Start: 2022-05-20 | End: 2023-05-07

## 2022-05-20 RX ORDER — IBUPROFEN 600 MG/1
600 TABLET ORAL EVERY 6 HOURS
Qty: 20 TABLET | Refills: 0 | OUTPATIENT
Start: 2022-05-20 | End: 2023-05-07

## 2022-05-20 RX ORDER — DOCUSATE SODIUM 100 MG/1
200 CAPSULE, LIQUID FILLED ORAL 2 TIMES DAILY PRN
Qty: 30 CAPSULE | Refills: 0 | OUTPATIENT
Start: 2022-05-20 | End: 2023-05-07

## 2022-05-20 RX ADMIN — HYDROCODONE BITARTRATE AND ACETAMINOPHEN 1 TABLET: 7.5; 325 TABLET ORAL at 08:05

## 2022-05-20 RX ADMIN — HYDROCODONE BITARTRATE AND ACETAMINOPHEN 1 TABLET: 7.5; 325 TABLET ORAL at 01:05

## 2022-05-20 RX ADMIN — IBUPROFEN 600 MG: 600 TABLET ORAL at 12:05

## 2022-05-20 RX ADMIN — PRENATAL VITAMINS-IRON FUMARATE 27 MG IRON-FOLIC ACID 0.8 MG TABLET 1 TABLET: at 08:05

## 2022-05-20 RX ADMIN — IBUPROFEN 600 MG: 600 TABLET ORAL at 06:05

## 2022-05-20 NOTE — DISCHARGE SUMMARY
[facility] - 2nd Floor Mother/Baby Unit  Obstetrics  Discharge Summary      Patient Name: Barbara Mejia  MRN: [id number]  Admission Date: 2022  Hospital Length of Stay: 2 days  Discharge Date and Time:  2022 8:27 AM  Attending Physician: Sanket Chapman MD   Discharging Provider: Abi Gordon MD   Primary Care Provider: Sanket Burr III, APRN-CNP    HPI: Barbara Mejia is a 31 y.o.  at 35w0d presents complaining of contractions  This IUP is complicated by history of  delivery, anxiety and bipolar     Patient denies vaginal bleeding and leakage of fluid.  Fetal Movement: normal.        Hospital Course:   Pt progressed as expected. VS WNL. Ambulating. Pain improving       Consults (From admission, onward)          Status Ordering Provider     Inpatient consult to Anesthesiology  Once        Provider:  (Not yet assigned)    SANKET López     Inpatient consult to Anesthesiology  Once        Provider:  Collins Koehler MD    Acknowledged SANKET CHAPMAN            Final Active Diagnoses:    Diagnosis Date Noted POA    PRINCIPAL PROBLEM:   contractions [O47.00] 2022 Unknown      Problems Resolved During this Admission:    Diagnosis Date Noted Date Resolved POA    Pregnancy [Z34.90] 2021 Not Applicable            Immunizations       Date Immunization Status Dose Route/Site Given by    22 MMR Incomplete 0.5 mL Subcutaneous/     22 Tdap Incomplete 0.5 mL Intramuscular/             Delivery:    Episiotomy:     Lacerations:     Repair suture:     Repair # of packets:     Blood loss (ml):       Birth information:  YOB: 2021   Time of birth: 10:04 PM   Sex: female   Delivery type: Vaginal, Spontaneous   Gestational Age: 35w0d    Delivery Clinician:      Other providers:       Additional  information:  Forceps:    Vacuum:    Breech:    Observed anomalies      Living?:            APGARS  One minute Five minutes Ten minutes   Skin color:         Heart rate:         Grimace:         Muscle tone:         Breathing:         Totals: 8  9        Placenta: Delivered:       appearance    Pending Diagnostic Studies:       Procedure Component Value Units Date/Time    RPR [[id number]] Collected: 05/18/22 2104    Order Status: Sent Lab Status: No result     Specimen: Blood             Discharged Condition: good    Disposition: Home or Self Care    Follow Up:   Follow-up Information       [facility]. Schedule an appointment as soon as possible for a visit in 6 week(s).    Specialty: Family Medicine  Why: Clinic will call with appt.  Contact information:  [address] [facility] [address] [phone]                         Patient Instructions:   FM will call with F/u appt. Post-partum visit in 6 weeks    Medications:  Current Discharge Medication List        START taking these medications    Details   benzocaine-lanolin (DERMOPLAST) 20-0.5 % Aero Apply topically 2 (two) times daily as needed.  Qty: 56 g, Refills: 0      docusate sodium (COLACE) 100 MG capsule Take 2 capsules (200 mg total) by mouth 2 (two) times daily as needed for Constipation.  Qty: 30 capsule, Refills: 0      HYDROcodone-acetaminophen (NORCO) 5-325 mg per tablet Take 1 tablet by mouth every 6 (six) hours as needed for Pain.  Qty: 10 tablet, Refills: 0    Comments: Quantity prescribed more than 7 day supply? no      ibuprofen (ADVIL,MOTRIN) 600 MG tablet Take 1 tablet (600 mg total) by mouth every 6 (six) hours.  Qty: 20 tablet, Refills: 0      medroxyPROGESTERone (DEPO-PROVERA) 150 mg/mL Syrg Inject 1 mL (150 mg total) into the muscle once. for 1 dose  Qty: 1 mL, Refills: 0           CONTINUE these medications which have NOT CHANGED    Details   folic acid (FOLVITE) 400 MCG tablet Take 0.4 mg by mouth.      lurasidone (LATUDA) 20 mg Tab tablet Take 20 mg by mouth once daily.      prenatal  25/iron fum/folic/dha (PRENATAL-1 ORAL) Take 1 mg by mouth once daily.           Plan:  Depo to be given prior to discharge  [facility] will call with F/u appt for PP visit in 6 weeks        Abi Gordon MD  [facility]  Obstetrics Service  [facility] - 2nd Floor Mother/Baby Unit    I examined the patient and I concur   Julio Warner M.D.

## 2022-05-23 ENCOUNTER — PATIENT OUTREACH (OUTPATIENT)
Dept: ADMINISTRATIVE | Facility: CLINIC | Age: 32
End: 2022-05-23
Payer: MEDICAID

## 2022-05-23 NOTE — PROGRESS NOTES
C3 nurse spoke with Barbara Mejia for a TCC post hospital discharge follow up and requested callback. Patient states she is with her baby at [facility].

## 2022-05-24 NOTE — PROGRESS NOTES
C3 nurse spoke with Barbara Mejia for a TCC post hospital discharge follow up call. The patient has a scheduled HOSFU appointment with [facility] on 07/01/2022.    Patient states she will call for F/U with PCP Martínez Burr III APRN_CNP. Within 7-14 days .

## 2023-05-07 ENCOUNTER — HOSPITAL ENCOUNTER (EMERGENCY)
Facility: HOSPITAL | Age: 33
Discharge: HOME | End: 2023-05-07
Attending: FAMILY MEDICINE
Payer: MEDICAID

## 2023-05-07 VITALS
TEMPERATURE: 98 F | HEIGHT: 63 IN | WEIGHT: 138 LBS | BODY MASS INDEX: 24.45 KG/M2 | SYSTOLIC BLOOD PRESSURE: 115 MMHG | OXYGEN SATURATION: 99 % | HEART RATE: 106 BPM | RESPIRATION RATE: 18 BRPM | DIASTOLIC BLOOD PRESSURE: 68 MMHG

## 2023-05-07 DIAGNOSIS — L03.90 CELLULITIS: ICD-10-CM

## 2023-05-07 DIAGNOSIS — N30.01 ACUTE CYSTITIS WITH HEMATURIA: ICD-10-CM

## 2023-05-07 DIAGNOSIS — L03.115 CELLULITIS OF RIGHT FOOT: Primary | ICD-10-CM

## 2023-05-07 LAB
APPEARANCE UR: ABNORMAL
B-HCG UR QL: NEGATIVE
BACTERIA #/AREA URNS AUTO: ABNORMAL /HPF
BILIRUB UR QL STRIP.AUTO: NEGATIVE MG/DL
COLOR UR AUTO: YELLOW
CTP QC/QA: YES
GLUCOSE UR QL STRIP.AUTO: NORMAL MG/DL
HYALINE CASTS #/AREA URNS LPF: ABNORMAL /LPF
KETONES UR QL STRIP.AUTO: NEGATIVE MG/DL
LEUKOCYTE ESTERASE UR QL STRIP.AUTO: 500 UNIT/L
MUCOUS THREADS URNS QL MICRO: ABNORMAL /LPF
NITRITE UR QL STRIP.AUTO: NEGATIVE
PH UR STRIP.AUTO: 7.5 [PH]
PROT UR QL STRIP.AUTO: ABNORMAL MG/DL
RBC #/AREA URNS AUTO: ABNORMAL /HPF
RBC UR QL AUTO: ABNORMAL UNIT/L
SP GR UR STRIP.AUTO: 1.01
SQUAMOUS #/AREA URNS LPF: ABNORMAL /HPF
UROBILINOGEN UR STRIP-ACNC: ABNORMAL MG/DL
WBC #/AREA URNS AUTO: ABNORMAL /HPF

## 2023-05-07 PROCEDURE — 81001 URINALYSIS AUTO W/SCOPE: CPT | Performed by: PHYSICIAN ASSISTANT

## 2023-05-07 PROCEDURE — 99284 EMERGENCY DEPT VISIT MOD MDM: CPT

## 2023-05-07 PROCEDURE — 96372 THER/PROPH/DIAG INJ SC/IM: CPT | Performed by: PHYSICIAN ASSISTANT

## 2023-05-07 PROCEDURE — 63600175 PHARM REV CODE 636 W HCPCS: Performed by: PHYSICIAN ASSISTANT

## 2023-05-07 PROCEDURE — 81025 URINE PREGNANCY TEST: CPT | Performed by: PHYSICIAN ASSISTANT

## 2023-05-07 PROCEDURE — 87088 URINE BACTERIA CULTURE: CPT | Performed by: PHYSICIAN ASSISTANT

## 2023-05-07 RX ORDER — SULFAMETHOXAZOLE AND TRIMETHOPRIM 800; 160 MG/1; MG/1
1 TABLET ORAL 2 TIMES DAILY
Qty: 20 TABLET | Refills: 0 | Status: SHIPPED | OUTPATIENT
Start: 2023-05-07 | End: 2023-05-17

## 2023-05-07 RX ORDER — IBUPROFEN 800 MG/1
800 TABLET ORAL EVERY 8 HOURS PRN
Qty: 20 TABLET | Refills: 0 | Status: SHIPPED | OUTPATIENT
Start: 2023-05-07

## 2023-05-07 RX ORDER — KETOROLAC TROMETHAMINE 30 MG/ML
60 INJECTION, SOLUTION INTRAMUSCULAR; INTRAVENOUS
Status: COMPLETED | OUTPATIENT
Start: 2023-05-07 | End: 2023-05-07

## 2023-05-07 RX ADMIN — KETOROLAC TROMETHAMINE 60 MG: 30 INJECTION, SOLUTION INTRAMUSCULAR; INTRAVENOUS at 10:05

## 2023-05-08 ENCOUNTER — HOSPITAL ENCOUNTER (EMERGENCY)
Facility: HOSPITAL | Age: 33
Discharge: HOME | End: 2023-05-09
Attending: INTERNAL MEDICINE
Payer: MEDICAID

## 2023-05-08 VITALS
OXYGEN SATURATION: 98 % | HEIGHT: 66 IN | RESPIRATION RATE: 16 BRPM | SYSTOLIC BLOOD PRESSURE: 117 MMHG | DIASTOLIC BLOOD PRESSURE: 81 MMHG | BODY MASS INDEX: 28.12 KG/M2 | TEMPERATURE: 98 F | WEIGHT: 175 LBS | HEART RATE: 92 BPM

## 2023-05-08 DIAGNOSIS — L03.115 CELLULITIS OF RIGHT FOOT: Primary | ICD-10-CM

## 2023-05-08 LAB
BASOPHILS # BLD AUTO: 0.02 X10(3)/MCL
BASOPHILS NFR BLD AUTO: 0.2 %
EOSINOPHIL # BLD AUTO: 0.14 X10(3)/MCL (ref 0–0.9)
EOSINOPHIL NFR BLD AUTO: 1.6 %
ERYTHROCYTE [DISTWIDTH] IN BLOOD BY AUTOMATED COUNT: 12.6 % (ref 11.5–17)
HCT VFR BLD AUTO: 43.8 % (ref 37–47)
HGB BLD-MCNC: 14.3 G/DL (ref 12–16)
IMM GRANULOCYTES # BLD AUTO: 0.03 X10(3)/MCL (ref 0–0.04)
IMM GRANULOCYTES NFR BLD AUTO: 0.3 %
LYMPHOCYTES # BLD AUTO: 2.23 X10(3)/MCL (ref 0.6–4.6)
LYMPHOCYTES NFR BLD AUTO: 25 %
MCH RBC QN AUTO: 30.6 PG (ref 27–31)
MCHC RBC AUTO-ENTMCNC: 32.6 G/DL (ref 33–36)
MCV RBC AUTO: 93.8 FL (ref 80–94)
MONOCYTES # BLD AUTO: 0.57 X10(3)/MCL (ref 0.1–1.3)
MONOCYTES NFR BLD AUTO: 6.4 %
NEUTROPHILS # BLD AUTO: 5.94 X10(3)/MCL (ref 2.1–9.2)
NEUTROPHILS NFR BLD AUTO: 66.5 %
NRBC BLD AUTO-RTO: 0 %
PLATELET # BLD AUTO: 268 X10(3)/MCL (ref 130–400)
PMV BLD AUTO: 10.8 FL (ref 7.4–10.4)
RBC # BLD AUTO: 4.67 X10(6)/MCL (ref 4.2–5.4)
WBC # SPEC AUTO: 8.93 X10(3)/MCL (ref 4.5–11.5)

## 2023-05-08 PROCEDURE — 25000003 PHARM REV CODE 250: Performed by: NURSE PRACTITIONER

## 2023-05-08 PROCEDURE — 99283 EMERGENCY DEPT VISIT LOW MDM: CPT

## 2023-05-08 PROCEDURE — 85025 COMPLETE CBC W/AUTO DIFF WBC: CPT | Performed by: EMERGENCY MEDICINE

## 2023-05-08 RX ORDER — SULFAMETHOXAZOLE AND TRIMETHOPRIM 800; 160 MG/1; MG/1
1 TABLET ORAL
Status: COMPLETED | OUTPATIENT
Start: 2023-05-08 | End: 2023-05-08

## 2023-05-08 RX ORDER — HYDROCODONE BITARTRATE AND ACETAMINOPHEN 10; 325 MG/1; MG/1
1 TABLET ORAL
Status: COMPLETED | OUTPATIENT
Start: 2023-05-08 | End: 2023-05-08

## 2023-05-08 RX ADMIN — SULFAMETHOXAZOLE AND TRIMETHOPRIM 1 TABLET: 800; 160 TABLET ORAL at 11:05

## 2023-05-08 RX ADMIN — HYDROCODONE BITARTRATE AND ACETAMINOPHEN 1 TABLET: 10; 325 TABLET ORAL at 11:05

## 2023-05-08 NOTE — ED PROVIDER NOTES
Encounter Date: 5/7/2023       History     Chief Complaint   Patient presents with    Foot Injury     Rt foot pain, itching all over and scratched her rt foot.  This happen last pm      31 YO WF in ER with complaints of pain and redness to right foot with 2 sores on her toes. States she scratched her foot really hard and thinks she may have infected it. Also having dysuria with hematuria X 2 days. Denies fever, chills, chest pain, SOB, abdominal pain, N/V/D, HA or dizziness. No other complaints.     The history is provided by the patient.   Review of patient's allergies indicates:  No Known Allergies  Past Medical History:   Diagnosis Date    ADHD     Anxiety     Asthma     Epilepsy     Herpes simplex virus (HSV) infection     Mental disorder     bipolar depression     No past surgical history on file.  Family History   Problem Relation Age of Onset    Depression Mother     Heart disease Mother     Depression Father     Hypertension Father      Social History     Tobacco Use    Smoking status: Every Day     Packs/day: 0.50     Types: Cigarettes     Start date: 5/17/2009    Smokeless tobacco: Never   Substance Use Topics    Alcohol use: Not Currently    Drug use: Not Currently     Review of Systems   Constitutional:  Negative for chills and fever.   HENT:  Negative for congestion and sore throat.    Respiratory:  Negative for shortness of breath.    Cardiovascular:  Negative for chest pain.   Gastrointestinal:  Negative for abdominal pain, diarrhea, nausea and vomiting.   Genitourinary:  Positive for dysuria and hematuria.   Musculoskeletal:  Negative for back pain.   Skin:  Positive for color change and wound. Negative for rash.   Neurological:  Negative for dizziness, weakness, light-headedness and headaches.   Hematological:  Does not bruise/bleed easily.   All other systems reviewed and are negative.    Physical Exam     Initial Vitals [05/07/23 2035]   BP Pulse Resp Temp SpO2   115/68 106 18 98.1 °F (36.7 °C) 99  %      MAP       --         Physical Exam    Nursing note and vitals reviewed.  Constitutional: She appears well-developed and well-nourished. She is not diaphoretic. No distress.   HENT:   Head: Normocephalic and atraumatic.   Nose: Nose normal.   Eyes: Conjunctivae are normal.   Neck: Neck supple.   Cardiovascular:  Normal rate, regular rhythm and intact distal pulses.           Pulmonary/Chest: Breath sounds normal.   Musculoskeletal:         General: Normal range of motion.      Cervical back: Neck supple.      Right foot: Normal capillary refill. Tenderness present. Normal pulse.        Legs:      Neurological: She is alert and oriented to person, place, and time. She has normal strength.   Skin: Skin is warm and dry. Lesion noted.        Small area if cellulitis to dorsum of right foot with purulent drainage coming from 2 skin lesions on her 2nd and 4th toes with crusting (yellow) and exquisite ttp   Psychiatric: She has a normal mood and affect.       ED Course   Procedures  Labs Reviewed   URINALYSIS, REFLEX TO URINE CULTURE - Abnormal; Notable for the following components:       Result Value    Appearance, UA Turbid (*)     Protein, UA Trace (*)     Blood, UA 2+ (*)     Urobilinogen, UA 2+ (*)     Leukocyte Esterase,  (*)     WBC, UA 11-20 (*)     Bacteria, UA Many (*)     Squamous Epithelial Cells, UA Many (*)     Mucous, UA Trace (*)     Hyaline Casts, UA 0-2 (*)     All other components within normal limits   CULTURE, URINE   POCT URINE PREGNANCY          Imaging Results              X-Ray Foot Complete Right (Preliminary result)  Result time 05/07/23 22:28:58      Wet Read by IDALIA Mcnair (05/07/23 22:28:58, [facility], Emergency Medicine)    No acute bony or soft tissue abnormality noted                                     Medications   ketorolac injection 60 mg (60 mg Intramuscular Given 5/7/23 2207)                 ED Course as of 05/07/23 2231   Sun May 07, 2023    2228 VSS, NAD, pt is non-toxic or ill appearing, labs and imaging reviewed with pt, treatment plan and discharge instructions including follow up discussed, pt verbalized understanding, all questions answered, pt is stable and ready for discharge  [TT]      ED Course User Index  [TT] IDALIA Mcnair                 Clinical Impression:   Final diagnoses:  [L03.90] Cellulitis  [L03.115] Cellulitis of right foot (Primary)  [N30.01] Acute cystitis with hematuria        ED Disposition Condition    Discharge Stable          ED Prescriptions       Medication Sig Dispense Start Date End Date Auth. Provider    sulfamethoxazole-trimethoprim 800-160mg (BACTRIM DS) 800-160 mg Tab Take 1 tablet by mouth 2 (two) times daily. for 10 days 20 tablet 5/7/2023 5/17/2023 IDALIA Mcnair    ibuprofen (ADVIL,MOTRIN) 800 MG tablet Take 1 tablet (800 mg total) by mouth every 8 (eight) hours as needed for Pain. 20 tablet 5/7/2023 -- IDALIA Mcnair          Follow-up Information       Follow up With Specialties Details Why Contact Info    Martínez Burr III, APRN-CNP Family Medicine Schedule an appointment as soon as possible for a visit in 3 days  [address] [facility] [address]  [phone]      [facility] Emergency Medicine In 3 days As needed, If symptoms worsen [address] [facility] [address] [phone]             IDALIA Mcnair  05/07/23 3638

## 2023-05-09 NOTE — ED PROVIDER NOTES
Encounter Date: 5/8/2023       History     Chief Complaint   Patient presents with    Foot Injury     C/o r foot pain r/t cellulitis states started on abx but has not been able to fill rx since receiving last night. States no test were ran and toes turning black, area has darkened scabs. Good color and cap refill.      See MDM    The history is provided by the patient. No  was used.   Review of patient's allergies indicates:  No Known Allergies  Past Medical History:   Diagnosis Date    ADHD     Anxiety     Asthma     Epilepsy     Herpes simplex virus (HSV) infection     Mental disorder     bipolar depression     History reviewed. No pertinent surgical history.  Family History   Problem Relation Age of Onset    Depression Mother     Heart disease Mother     Depression Father     Hypertension Father      Social History     Tobacco Use    Smoking status: Every Day     Packs/day: 0.50     Years: 10.00     Pack years: 5.00     Types: Cigarettes     Start date: 5/17/2009    Smokeless tobacco: Never   Substance Use Topics    Alcohol use: Not Currently    Drug use: Not Currently     Review of Systems   Constitutional:  Negative for fever.   Respiratory:  Negative for cough and shortness of breath.    Cardiovascular:  Negative for chest pain.   Gastrointestinal:  Negative for abdominal pain.   Genitourinary:  Negative for difficulty urinating and dysuria.   Musculoskeletal:  Negative for gait problem.   Skin:  Negative for color change.   Neurological:  Negative for dizziness, speech difficulty and headaches.   Psychiatric/Behavioral:  Negative for hallucinations and suicidal ideas.    All other systems reviewed and are negative.    Physical Exam     Initial Vitals [05/08/23 2127]   BP Pulse Resp Temp SpO2   117/81 92 20 97.9 °F (36.6 °C) 98 %      MAP       --         Physical Exam    Nursing note and vitals reviewed.  Constitutional: She appears well-developed and well-nourished.   HENT:   Head:  Normocephalic.   Eyes: EOM are normal.   Neck:   Normal range of motion.  Cardiovascular:  Normal rate, regular rhythm, normal heart sounds and intact distal pulses.           Pulmonary/Chest: Breath sounds normal. No respiratory distress.   Abdominal: Abdomen is soft. Bowel sounds are normal. There is no abdominal tenderness.   Musculoskeletal:         General: Normal range of motion.      Cervical back: Normal range of motion.     Neurological: She is alert and oriented to person, place, and time. She has normal strength.   Skin: Skin is warm and dry.   To open sores with cellulitis to the right foot   Psychiatric: She has a normal mood and affect. Her behavior is normal. Judgment and thought content normal.       ED Course   Procedures  Labs Reviewed   CBC WITH DIFFERENTIAL - Abnormal; Notable for the following components:       Result Value    MCHC 32.6 (*)     MPV 10.8 (*)     All other components within normal limits   CBC W/ AUTO DIFFERENTIAL    Narrative:     The following orders were created for panel order CBC auto differential.  Procedure                               Abnormality         Status                     ---------                               -----------         ------                     CBC with Differential[[id number]]        Abnormal            Final result                 Please view results for these tests on the individual orders.          Imaging Results    None          Medications   sulfamethoxazole-trimethoprim 800-160mg per tablet 1 tablet (has no administration in time range)   HYDROcodone-acetaminophen  mg per tablet 1 tablet (has no administration in time range)     Medical Decision Making:   Initial Assessment:   Historian:  Patient.  Patient is a 32-year-old female  that presents with redness and swelling right foot that has been present yesterday. Associated symptoms nothing. Surrounding information is patient kept scratching foot causing an abrasion.  This became  infected.  She did go to another ER and was prescribed Bactrim.  States the pharmacy will not have it available to tomorrow.. Exacerbated by nothing. Relieved by nothing. Patient treatment prior to arrival none. Risk factors include none. Other history pertaining to this complaint nothing.   Assessment:  See physical exam.    Differential Diagnosis:   Cellulitis, impetigo  ED Management:  History was obtained.  Physical was performed.  Patient did have what looks like cellulitis to right foot.  There was no necrosis.  She did have 2 scabbing areas on the toes.  She is not able to get her antibiotics tomorrow.  I will give her a dose of Bactrim in the ER.  We will also give her some pain medicine in the ER.  He is currently on benzodiazepine so I will not write her for any narcotic pain medicine to go home with.  She was instructed to elevate the foot as often as possible and use warm compresses to the area.  Strict return precautions were advised.  She was instructed to return to the emergency room 2 days for recheck.  No medical or surgical consult indicated in ER.  No social determinants that affect her healthcare were noted.                        Clinical Impression:   Final diagnoses:  [L03.115] Cellulitis of right foot (Primary)        ED Disposition Condition    Discharge Stable          ED Prescriptions    None       Follow-up Information       Follow up With Specialties Details Why Contact Info    Your Primary Care Provider  Call in 3 days ed follow up     Return to the emergency room in 2 days for recheck.                 JALEN Melissa  05/08/23 3548

## 2023-05-10 LAB — BACTERIA UR CULT: NORMAL

## 2023-06-08 ENCOUNTER — HOSPITAL ENCOUNTER (EMERGENCY)
Facility: HOSPITAL | Age: 33
Discharge: HOME | End: 2023-06-08
Attending: EMERGENCY MEDICINE
Payer: MEDICAID

## 2023-06-08 VITALS
RESPIRATION RATE: 20 BRPM | TEMPERATURE: 98 F | HEART RATE: 78 BPM | OXYGEN SATURATION: 97 % | DIASTOLIC BLOOD PRESSURE: 68 MMHG | SYSTOLIC BLOOD PRESSURE: 100 MMHG
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
## 2023-06-08 DIAGNOSIS — L01.00 IMPETIGO: ICD-10-CM

## 2023-06-08 DIAGNOSIS — S92.341A CLOSED DISPLACED FRACTURE OF FOURTH METATARSAL BONE OF RIGHT FOOT, INITIAL ENCOUNTER: Primary | ICD-10-CM

## 2023-06-08 PROCEDURE — 63600175 PHARM REV CODE 636 W HCPCS: Performed by: PHYSICIAN ASSISTANT

## 2023-06-08 PROCEDURE — 99284 EMERGENCY DEPT VISIT MOD MDM: CPT | Mod: 25

## 2023-06-08 PROCEDURE — 96372 THER/PROPH/DIAG INJ SC/IM: CPT | Performed by: PHYSICIAN ASSISTANT

## 2023-06-08 RX ORDER — KETOROLAC TROMETHAMINE 30 MG/ML
30 INJECTION, SOLUTION INTRAMUSCULAR; INTRAVENOUS
Status: COMPLETED | OUTPATIENT
Start: 2023-06-08 | End: 2023-06-08

## 2023-06-08 RX ORDER — HYDROCODONE BITARTRATE AND ACETAMINOPHEN 5; 325 MG/1; MG/1
1 TABLET ORAL EVERY 6 HOURS PRN
Qty: 12 TABLET | Refills: 0 | Status: SHIPPED | OUTPATIENT
Start: 2023-06-08

## 2023-06-08 RX ORDER — MUPIROCIN 20 MG/G
OINTMENT TOPICAL 3 TIMES DAILY
Qty: 1 G | Refills: 0 | Status: SHIPPED | OUTPATIENT
Start: 2023-06-08

## 2023-06-08 RX ORDER — CEPHALEXIN 500 MG/1
500 CAPSULE ORAL EVERY 12 HOURS
Qty: 14 CAPSULE | Refills: 0 | Status: SHIPPED | OUTPATIENT
Start: 2023-06-08 | End: 2023-06-15

## 2023-06-08 RX ADMIN — KETOROLAC TROMETHAMINE 30 MG: 30 INJECTION, SOLUTION INTRAMUSCULAR; INTRAVENOUS at 02:06

## 2023-06-08 NOTE — ED PROVIDER NOTES
Encounter Date: 6/8/2023       History     Chief Complaint   Patient presents with    Foot Pain     Pt c/o right foot pain w/ swelling after falling down 2 stairs. She reports twisting and landing on her foot only. Able to ambulate in triage.      32-year-old white female presents with 2 day history of right foot pain after falling down 2 stairs.  Patient reports twisting and landing on her foot.  She is ambulatory in triage.  She also complains of sores on her body.  Multiple children in her household have impetigo.    The history is provided by the patient. No  was used.   Leg Pain   The incident occurred at home. The injury mechanism was a fall. The incident occurred several days ago. The pain is present in the right foot. The quality of the pain is described as throbbing. The pain is at a severity of 5/10. The pain has been Fluctuating since onset. Pertinent negatives include no numbness, no muscle weakness and no loss of sensation. She reports no foreign bodies present. The symptoms are aggravated by bearing weight. She has tried acetaminophen and NSAIDs for the symptoms.   Review of patient's allergies indicates:  No Known Allergies  Past Medical History:   Diagnosis Date    ADHD     Anxiety     Asthma     Epilepsy     Herpes simplex virus (HSV) infection     Mental disorder     bipolar depression     No past surgical history on file.  Family History   Problem Relation Age of Onset    Depression Mother     Heart disease Mother     Depression Father     Hypertension Father      Social History     Tobacco Use    Smoking status: Every Day     Packs/day: 0.50     Years: 10.00     Pack years: 5.00     Types: Cigarettes     Start date: 5/17/2009    Smokeless tobacco: Never   Substance Use Topics    Alcohol use: Not Currently    Drug use: Not Currently     Review of Systems   Constitutional:  Negative for fever.   HENT:  Negative for sore throat.    Respiratory:  Negative for shortness of breath.     Cardiovascular:  Negative for chest pain.   Gastrointestinal:  Negative for nausea.   Genitourinary:  Negative for dysuria.   Musculoskeletal:  Negative for back pain.   Skin:  Negative for rash.   Neurological:  Negative for weakness and numbness.   Hematological:  Does not bruise/bleed easily.     Physical Exam     Initial Vitals [06/08/23 1309]   BP Pulse Resp Temp SpO2   100/68 78 20 98 °F (36.7 °C) 97 %      MAP       --         Physical Exam    Nursing note and vitals reviewed.  Constitutional: She appears well-developed and well-nourished.   HENT:   Head: Normocephalic and atraumatic.   Cardiovascular:  Normal rate, regular rhythm and normal heart sounds.           Pulmonary/Chest: Breath sounds normal.   Abdominal: Abdomen is soft. Bowel sounds are normal.   Musculoskeletal:         General: Normal range of motion.      Comments: Patient has tenderness to palpation overlying the right 4th metatarsal.  There are multiple open excoriated wounds noted over her 4th and 5th metatarsal consistent with impetigo, these are honey-colored.     Neurological: She is alert and oriented to person, place, and time.   Skin: Skin is warm.       ED Course   Procedures  Labs Reviewed - No data to display       Imaging Results              X-Ray Foot Complete Right (Final result)  Result time 06/08/23 14:06:29      Final result by Phi Cotter MD (06/08/23 14:06:29)                   Impression:      Minimally displaced 4th metatarsal fracture.      Electronically signed by: Phi Cotter  Date:    06/08/2023  Time:    14:06               Narrative:    EXAMINATION:  XR FOOT COMPLETE 3 VIEW RIGHT    CLINICAL HISTORY:  . Unspecified fall, initial encounter    TECHNIQUE:  AP, lateral and oblique views of the right foot    COMPARISON:  Radiography 05/07/2023    FINDINGS:  Minimally displaced fracture through the neck of the 4th metatarsal.  No other acute fracture identified.  Joint alignments are maintained.                                        Medications - No data to display     Additional MDM:   Differential Diagnosis:   Other: The following diagnoses were also considered and will be evaluated: Fracture. Patient has a fracture of her 4th metatarsal, will place her in a walking boot.  Secondary to life circumstances she will be given a set of crutches however I have discussed with her that these may put her at further risk for fall with multiple young children surrounding her.  Discussed with her she will need to see Orthopedics at [facility] to make sure that her metatarsal is healing appropriately.  Discussed with patient that if she does not clear the impetigo and finish her course of medication she will continue to have sores.                        Clinical Impression:   Final diagnoses:  [W19.XXXA] Fall  [S92.341A] Closed displaced fracture of fourth metatarsal bone of right foot, initial encounter (Primary)  [L01.00] Impetigo        ED Disposition Condition    Discharge Stable          ED Prescriptions       Medication Sig Dispense Start Date End Date Auth. Provider    cephALEXin (KEFLEX) 500 MG capsule Take 1 capsule (500 mg total) by mouth every 12 (twelve) hours. for 7 days 14 capsule 6/8/2023 6/15/2023 Joselin Toribio PA-C    mupirocin (BACTROBAN) 2 % ointment Apply topically 3 (three) times daily. 1 g 6/8/2023 -- Joselin Toribio PA-C    HYDROcodone-acetaminophen (NORCO) 5-325 mg per tablet Take 1 tablet by mouth every 6 (six) hours as needed for Pain. 12 tablet 6/8/2023 -- Joselin Toribio PA-C          Follow-up Information       Follow up With Specialties Details Why Contact Info    Martínez Burr III, APRN-CNP Family Medicine Schedule an appointment as soon as possible for a visit in 3 days  [address] [facility] [address]  [phone]      [facility]  Schedule an appointment as soon as possible for a visit in 3 days Orthopedics [address] [facility] [address] [phone]               Joselin Toribio  BINDU  06/08/23 1444

## 2023-06-08 NOTE — DISCHARGE INSTRUCTIONS
When up and walking around please use the walking boot and crutches if you feel that your stable on them.  Take the medicine as directed, do not drive while taking it.  Follow-up in the next 7-10 days with [facility] for metatarsal fracture.

## 2023-06-08 NOTE — ED NOTES
Pt w recent dx impetigo - red scab lesions on bilat feet noted- now has redness/swelling to rt foot.  Free of drainage. Pedal pulses +2x2/equal. Cap refill <2secs/ambulating on foot/afebrile.

## 2024-05-22 ENCOUNTER — LAB VISIT (OUTPATIENT)
Dept: LAB | Facility: HOSPITAL | Age: 34
End: 2024-05-22
Attending: OBSTETRICS & GYNECOLOGY
Payer: COMMERCIAL

## 2024-05-22 DIAGNOSIS — Z34.81 SUPERVISION OF NORMAL INTRAUTERINE PREGNANCY IN MULTIGRAVIDA, FIRST TRIMESTER: ICD-10-CM

## 2024-05-22 DIAGNOSIS — Z03.89 ENCNTR FOR OBS FOR OTH SUSPECTED DISEASES AND COND RULED OUT: ICD-10-CM

## 2024-05-22 LAB
BASOPHILS # BLD AUTO: 0.03 X10(3)/MCL
BASOPHILS NFR BLD AUTO: 0.4 %
EOSINOPHIL # BLD AUTO: 0.19 X10(3)/MCL (ref 0–0.9)
EOSINOPHIL NFR BLD AUTO: 2.3 %
ERYTHROCYTE [DISTWIDTH] IN BLOOD BY AUTOMATED COUNT: 12.3 % (ref 11.5–17)
GROUP & RH: NORMAL
HCT VFR BLD AUTO: 37.2 % (ref 37–47)
HGB BLD-MCNC: 12.6 G/DL (ref 12–16)
IMM GRANULOCYTES # BLD AUTO: 0.03 X10(3)/MCL (ref 0–0.04)
IMM GRANULOCYTES NFR BLD AUTO: 0.4 %
INDIRECT COOMBS: NORMAL
LYMPHOCYTES # BLD AUTO: 2.33 X10(3)/MCL (ref 0.6–4.6)
LYMPHOCYTES NFR BLD AUTO: 28.6 %
MCH RBC QN AUTO: 31 PG (ref 27–31)
MCHC RBC AUTO-ENTMCNC: 33.9 G/DL (ref 33–36)
MCV RBC AUTO: 91.6 FL (ref 80–94)
MONOCYTES # BLD AUTO: 0.34 X10(3)/MCL (ref 0.1–1.3)
MONOCYTES NFR BLD AUTO: 4.2 %
NEUTROPHILS # BLD AUTO: 5.23 X10(3)/MCL (ref 2.1–9.2)
NEUTROPHILS NFR BLD AUTO: 64.1 %
NRBC BLD AUTO-RTO: 0 %
PLATELET # BLD AUTO: 257 X10(3)/MCL (ref 130–400)
PMV BLD AUTO: 10.2 FL (ref 7.4–10.4)
RBC # BLD AUTO: 4.06 X10(6)/MCL (ref 4.2–5.4)
SPECIMEN OUTDATE: NORMAL
T PALLIDUM AB SER QL: NONREACTIVE
T4 FREE SERPL-MCNC: 0.73 NG/DL (ref 0.7–1.48)
TSH SERPL-ACNC: 0.76 UIU/ML (ref 0.35–4.94)
WBC # SPEC AUTO: 8.15 X10(3)/MCL (ref 4.5–11.5)

## 2024-05-22 PROCEDURE — 36415 COLL VENOUS BLD VENIPUNCTURE: CPT

## 2024-05-22 PROCEDURE — 85025 COMPLETE CBC W/AUTO DIFF WBC: CPT

## 2024-05-22 PROCEDURE — 86803 HEPATITIS C AB TEST: CPT

## 2024-05-22 PROCEDURE — 86787 VARICELLA-ZOSTER ANTIBODY: CPT

## 2024-05-22 PROCEDURE — 87389 HIV-1 AG W/HIV-1&-2 AB AG IA: CPT

## 2024-05-22 PROCEDURE — 84439 ASSAY OF FREE THYROXINE: CPT

## 2024-05-22 PROCEDURE — 86780 TREPONEMA PALLIDUM: CPT

## 2024-05-22 PROCEDURE — 87340 HEPATITIS B SURFACE AG IA: CPT

## 2024-05-22 PROCEDURE — 84443 ASSAY THYROID STIM HORMONE: CPT

## 2024-05-22 PROCEDURE — 86901 BLOOD TYPING SEROLOGIC RH(D): CPT | Performed by: OBSTETRICS & GYNECOLOGY

## 2024-05-22 PROCEDURE — 86762 RUBELLA ANTIBODY: CPT

## 2024-05-23 LAB
HBV SURFACE AG SERPL QL IA: NONREACTIVE
HCV AB SERPL QL IA: NONREACTIVE
HIV 1+2 AB+HIV1 P24 AG SERPL QL IA: NONREACTIVE
RUBV IGG SERPL IA-ACNC: 2.5
RUBV IGG SERPL QL IA: POSITIVE
VZV IGG SER IA-ACNC: 2.7
VZV IGG SER QL IA: POSITIVE

## 2024-07-01 DIAGNOSIS — Z36.89 ENCOUNTER FOR FETAL ANATOMIC SURVEY: Primary | ICD-10-CM

## 2024-07-01 DIAGNOSIS — O99.322 DRUG USE AFFECTING PREGNANCY IN SECOND TRIMESTER: ICD-10-CM

## 2024-07-01 PROBLEM — O98.512 HERPES SIMPLEX VIRUS TYPE 2 (HSV-2) INFECTION AFFECTING PREGNANCY IN SECOND TRIMESTER: Status: ACTIVE | Noted: 2022-05-20

## 2024-07-01 PROBLEM — O60.00 PRETERM LABOR: Status: RESOLVED | Noted: 2022-03-24 | Resolved: 2024-07-01

## 2024-07-01 PROBLEM — O99.320 SUBOXONE MAINTENANCE TREATMENT COMPLICATING PREGNANCY, ANTEPARTUM: Status: ACTIVE | Noted: 2024-07-01

## 2024-07-01 PROBLEM — G40.909 SEIZURE DISORDER DURING PREGNANCY IN SECOND TRIMESTER: Status: ACTIVE | Noted: 2024-07-01

## 2024-07-01 PROBLEM — O09.90 AT HIGH RISK FOR COMPLICATIONS OF INTRAUTERINE PREGNANCY (IUP): Status: ACTIVE | Noted: 2024-07-01

## 2024-07-01 PROBLEM — O09.299 PRIOR PREGNANCY COMPLICATED BY IUGR, ANTEPARTUM: Status: ACTIVE | Noted: 2024-07-01

## 2024-07-01 PROBLEM — F11.20 SUBOXONE MAINTENANCE TREATMENT COMPLICATING PREGNANCY, ANTEPARTUM: Status: ACTIVE | Noted: 2024-07-01

## 2024-07-01 PROBLEM — F19.90 SUBSTANCE USE DISORDER: Status: ACTIVE | Noted: 2024-07-01

## 2024-07-01 PROBLEM — B00.9 HERPES SIMPLEX VIRUS TYPE 2 (HSV-2) INFECTION AFFECTING PREGNANCY IN SECOND TRIMESTER: Status: ACTIVE | Noted: 2022-05-20

## 2024-07-01 PROBLEM — O09.212 PREGNANCY COMPLICATED BY PREVIOUS PRETERM LABOR IN SECOND TRIMESTER: Status: ACTIVE | Noted: 2024-07-01

## 2024-07-01 PROBLEM — Z78.9 CAFFEINE USE: Status: RESOLVED | Noted: 2022-03-24 | Resolved: 2024-07-01

## 2024-07-01 PROBLEM — O99.352 SEIZURE DISORDER DURING PREGNANCY IN SECOND TRIMESTER: Status: ACTIVE | Noted: 2024-07-01

## 2024-07-01 PROBLEM — O99.342 MENTAL DISORDER AFFECTING PREGNANCY IN SECOND TRIMESTER: Status: ACTIVE | Noted: 2022-05-17

## 2024-07-01 PROBLEM — F41.1 GAD (GENERALIZED ANXIETY DISORDER): Status: RESOLVED | Noted: 2022-05-17 | Resolved: 2024-07-01

## 2024-07-01 PROBLEM — O47.00 PRETERM CONTRACTIONS: Status: RESOLVED | Noted: 2022-05-20 | Resolved: 2024-07-01

## 2024-07-11 NOTE — PROGRESS NOTES
Maternal Fetal Medicine New Consult    Subjective:     Patient ID: [id number]    Chief Complaint: MFM Consult w/us  (For suboxene use /)      HPI: 33 y.o.  female  at 18w5d gestation with Estimated Date of Delivery: 24 by early US, not consistent with LMP. She is sent for MFM consultation for Suboxone use.     She has history of opiate addiction (oxycodone), that started after a previous delivery.  She reports last use about 8 months ago when she presented to clinic to start Subutex.  She is currently on Subutex 8 mg daily and doing well with that  She follows with Nicky Andersen in [place].  She currently smokes cigarettes, and she reports that she is working to cut down. She has history of epilepsy diagnosed around 16 years old.  She was on medication in the past, but she has currently on no seizure medications as she reports no seizure in many years.  She has historyof bipolar/schizophrenia.  She is currently on Lexapro 10 mg in the morning and 20 mg in the evening, and reports has done well with that for years.  She follows with her PCP for this.  She has history of HSV diagnosed by blood, reports never had an outbreak.  She has history of  labor and delivery in her last 3 pregnancies. She used vaginal progesterone previously. Her last 2 children were SGA weighing 4 lb 5 oz at 34 weeks and 4 lb 11 oz at 35 weeks respectively.       She denies any personal or family history of aneuploidy or anomalies. She denies any exposure to high fevers, viral rashes, illicit drugs or alcohol in this pregnancy.  She denies any leaking fluid, vaginal bleeding, contractions, decreased fetal movement. Denies headaches, visual disturbances, or epigastric pain.       Pregnancy complications include:  Patient Active Problem List   Diagnosis    Mental disorder affecting pregnancy in second trimester    Maternal tobacco use    History of seizure disorder    Herpes simplex virus type 2 (HSV-2) infection affecting  pregnancy in second trimester    At high risk for complications of intrauterine pregnancy (IUP)    Pregnancy complicated by previous  labor in second trimester    Prior pregnancy complicated by IUGR, antepartum    Seizure disorder during pregnancy in second trimester    Substance use disorder    Suboxone maintenance treatment complicating pregnancy, antepartum       Past Medical History:   Diagnosis Date    ADHD     Anemia     Anxiety     Epilepsy     Herpes simplex virus (HSV) infection     Mental disorder     bipolar depression    Sickle cell anemia        History reviewed. No pertinent surgical history.    Family History   Problem Relation Name Age of Onset    Depression Mother      Heart disease Mother      Depression Father      Hypertension Father         Social History     Socioeconomic History    Marital status: Single   Tobacco Use    Smoking status: Every Day     Current packs/day: 0.50     Average packs/day: 0.5 packs/day for 15.2 years (7.6 ttl pk-yrs)     Types: Cigarettes     Start date: 2009     Passive exposure: Current    Smokeless tobacco: Never   Substance and Sexual Activity    Alcohol use: Never    Drug use: Not Currently    Sexual activity: Yes     Partners: Male       Current Outpatient Medications   Medication Sig Dispense Refill    buprenorphine HCL (SUBUTEX) 8 mg Subl SMARTSI.5 Tablet(s) Sublingual Daily      EScitalopram oxalate (LEXAPRO) 10 MG tablet Take 10 mg by mouth.      EScitalopram oxalate (LEXAPRO) 20 MG tablet Take 20 mg by mouth.      PNV,calcium 72-iron,carb-folic (PRENATAL PLUS) 29 mg iron- 1 mg Tab Take 1 tablet by mouth once daily. 30 tablet 11     No current facility-administered medications for this visit.       Review of patient's allergies indicates:  No Known Allergies     Review of Systems   12 point review of systems conducted, negative except as stated in the history of present illness. See HPI for details.      Objective:     Visit Vitals  BP (!)  "95/54 (BP Location: Left arm, Patient Position: Sitting, BP Method: Medium (Automatic))   Pulse 66   Ht 5' 6" (1.676 m)   Wt 56.7 kg (125 lb)   LMP 2024   BMI 20.18 kg/m²        Physical Exam  Vitals and nursing note reviewed.   Constitutional:       Appearance: Normal appearance.   HENT:      Head: Normocephalic and atraumatic.      Nose: Nose normal. No congestion.   Cardiovascular:      Rate and Rhythm: Normal rate.   Pulmonary:      Effort: Pulmonary effort is normal.   Abdominal:      Comments: Soft abdomen nontender   Skin:     Findings: No rash.   Neurological:      Mental Status: She is alert and oriented to person, place, and time.   Psychiatric:         Mood and Affect: Mood normal.         Behavior: Behavior normal.         Thought Content: Thought content normal.         Judgment: Judgment normal.         Assessment/Plan:     33 y.o.  female with IUP at 18w5d    Substance use disorder, on Subutex  I discussed with her the likely cognitive developmental effect of those drugs that would not be visible by the ultrasound and the importance of long term  follow up. With opioid dependency, there is increase risk for respiratory depression and withdrawal syndrome with inadequate management that could increase risk for  labor, fetal distress and fetal demise. Withdrawal symptoms from inadequate management/acute withdrawal may onset between 24-36 hours and last several weeks with higher risks for illicit drug use. There is also a study that suggests possible risk for neural tube defect in mother's dependent on opioids.    I stressed the importance of complete abstinence from illicit drug use in pregnancy.  With subutex dependency, I discussed the risks and benefits of use in pregnancy. Subutex is recommended during pregnancy to avoid any potential prenatal exposure to naloxone and also severe induced opioid withdrawal with IV use that may precipitate  labor or fetal " distress.    Women who are maintained on appropriate subutex dosage have been shown to have a reduction in high risk drug seeking behaviors, are less likely to relapse, more likely to receive prenatal care, and the stable dosing reduces stress on the fetus from repeated withdrawal due to inconsistent availability of medication.    I also discussed that even at the lowest dose of subutex, infants are at risk for  abstinence syndrome ( characterized by CNS hyperactivity, poor sucking reflexes, irritability, high pitch cry) that can onset within 12 hours post delivery. However, recent studies have suggested advantages of Subutex use over Methadone.  Recent study from  in [facility], showed that among patients on Subutex, there is lower risk of adverse  outcomes with lower risk of  abstinence syndrome,  birth, small size for gestational, and low birth weight when compared to methadone.  The risk of adverse maternal outcomes were similar between methadone use urged and Subutex use her in pregnancy.  Some advantages of Subutex over Methadone include a lower risk of overdose, fewer drug interactions, the ability to be treated on an outpatient basis without the need for daily visits to a licensed treatment program, and evidence of less severe  abstinence syndrome. Some disadvantages of Subutex are hepatic dysfunction, the lack of long-term data on infant and child effects and diversion from program.  There is also lack of evidence on the long term neurodevelopmental effects on fetuses exposed to subutex. I explained the benefits of these medication outweigh the risks and I encouraged her to keep her follow-up with Patricia Andersen.    Consider doing intermittent drug screens, with risks and benefits of drug testing discussed. May consider serial ultrasounds to check fetal growth every 4-6 weeks and start  testing around 34 weeks till delivery.    I recommend alerting  pediatrician to the use of Subutex to be on the alert to manage  abstinence syndrome if it happens.  Both Methadone and Subutex are not contraindicated with breastfeeding.       Maternal smoking  I discussed with her adverse  outcomes associated with smoking in pregnancy. The increased  risks including first trimester miscarriage, fetal growth restriction, placenta previa,  delivery (5-8%),  premature rupture of membranes, low birth weight (13-19%), and placental abruption, all of which increased  morbidity and mortality.   There is also increase risk of SIDS (two fold higher risk than non smoker) in the infancy period, asthma, colic, obesity and cognitive problems with baby if smoking is continued. She was encouraged to quit smoking.      History of epilepsy  I discussed with her the association of seizure disorder with higher risk of anomalies even without any use of medication. There is an increased risk of both major and minor malformations in fetuses exposed to anti-epileptic drugs (4-6% vs population risk of 2-3%). The most common major congenital malformations associated with these medications are neural tube, congenital heart and urinary tract defects, skeletal abnormalities, and cleft palate. The risk of fetal malformations is strongly influenced by the specific medication used.  She has not been on any medications, and there is normal fetal anatomy today.    There are risks to the fetus from maternal seizures. Fetal hypoxia may occur as a result of decreased placental blood flow or postictal apnea and there are risks of injury to the fetus, abruption, or miscarriage due to maternal trauma sustained during a seizure.If she has any seizures, she needs to report that immediately.  It is also recommended to avoid driving or operating heavy/hazardous equipment until 6 months post last seizure.    Fetal surveillance as above.    Reported bipolar/schizophrenia, on  Lexapro  Discussed risks with depression/anxiety and risks/benefits of medication use in pregnancy. Although earlier limited data suggested association of paroxetine (Paxil) with right ventricular outflow tract obstruction and sertraline (Zoloft) with ventricular septal heart defects, a recent (2014) large population based study showed no substantial increase in the risk of cardiac malformations attributable to antidepressant use in 1st trimester. The absolute risk of other reported risks in some studies is very small: omphalocele of 1 in 5,000 births, craniosynostosis 1 in 1,800 births, and anencephaly of 1 in 1,000 births. I discussed with her that the SSRI medication used in pregnancy is associated with potential small risk of pulmonary hypertension when used after 20 weeks gestation (in 6-12 per thousand exposed women). However, discontinuing medication is associated with a higher risk with recurrence of symptoms . Relapse rate is 68% if medication is discontinued, vs 25% with continued medication use. Untreated depression may increase the risk of low weight gain, sexually transmitted diseases, alcohol & substance abuse, all of which have maternal and fetal health implications. Factors associated with relapse during pregnancy include a history of more than 5 years of depressive illness and of more than four episodes of relapse.     ACOG recommends that therapy for mental health disorders during pregnancy be individualized. Treatment of anxiety and depression should incorporate the clinical expertise of her mental health clinician, her obstetrician, her primary care provider, and her pediatrician. The risks of untreated depression and the benefits of treatment must be weighed against the risks associated with the use of psychiatric medications during pregnancy.    Depending upon the severity of her symptoms and previous response to discontinued medication, consider weaning down or off medications if possible.  Although discontinuing the medication for a few weeks before delivery  has been suggested, to avoid  withdrawal, the potential risks to mom and lack of proven benefit from this approach, makes continuing medication till delivery a reasonable option. Discussed the association of higher  morbidity with SSRI medication use close to delivery (in 30 % of neonates) including higher rate of admission to intensive care unit, jitteriness, mild respiratory distress, tachypnea of the , weak cry, and poor tone.     With symptom control, reasonable to continue Lexapro at this time. She was also advised to report any worsening of symptoms or SI/HI tendencies immediately to provider/ER for prompt intervention.  She was advised to continue follow up care with her PCP for now and she was given information for Mental Health providers in the area to establish care with them.  If she has true bipolar disorder, she may benefit from mood stabilizers.  If she is doing well on Lexapro only as she reports for many years, then she may just have depression.  Advised to discuss further with mental health provider.    Previous  delivery x3  I discussed with her the increased risk of recurrence of another  delivery with risk around 1/3. The risk ranges from 15% with one previous  delivery after 32 weeks that was followed by a term at birth to 60% with history of 2 consecutive  deliveries before 32 weeks. I have shared with her that the  delivery could occur at an earlier gestational age with more significant morbidity and high risk of  mortality associated with that.    Discussed withdrawal of 17P by FDA and new guidelines for management of previous  delivery. Recommend intermittent transvaginal ultrasounds starting at 16 weeks until 23 weeks. If cervical length is less than 3 cm, weekly TVUS is recommended and may consider vaginal progesterone nightly. If cervix is less  than 25 mm, consider cerclage, especially if previous  delivery less 28 weeks, than along with continuing vaginal progesterone nightly. Conversely, may consider nightly vaginal progesterone nightly starting at 16 weeks until 37 weeks regardless of cervical length, although no data of benefit if cervix longer than 3 cm. Discussed risks/benefits of all options, understanding that neither option is fully protective against pregnancy loss. She would like to start vaginal progesterone nightly in addition to cervical surveillance.    TVUS today 2024  with closed, short cervix 2.65 cm without funneling at the IO.  We will recheck in 1 week.    Will plan to do TVUS weekly at this time until 24 weeks.  With cervical length 2.65 cm, she will begin vaginal progesterone 200 mg nightly. If cervical length less than 2.5 cm, consider cerclage along with vaginal progesterone. PTL precautions given.       HSV in pregnancy  I discussed risks with HSV-2 in pregnancy. I discussed with her the standard of care with history of genital herpes, is to allow vaginal delivery if no visible lesions or an active outbreak is present. I discussed with her that the risk of asymptomatic viral shedding could lead to  transmission; however, the risk is so remote in someone with history of genital herpes not having an outbreak, that a  section is not recommended. A  section should be done for active lesions or prodromal symptoms in labor or PROM near term.     Potential benefit of using antiherpetic treatment like Valtrex to decrease the risk of outbreak at the time of labor, as well as decrease the risk of asymptomatic viral shedding was addressed. The risks and benefits were explained and recommend start suppressive treatment with 500mg of Valtrex BID OR acyclovir 400 mg three times a day, around 32 weeks gestation till delivery (a little earlier with her history of  delivery and shortened cervical  length).       Invasive monitoring in labor could increase risk of  transmission by 6 fold. However, if clear indication for fetal scalp monitoring is present, it could reasonably be done in patient with no recurrent disease with no visible lesions.       History of SGA infants x2  Discussed potential risk of recurrence of growth restriction in subsequent pregnancies. There is normal fetal growth today.  We will plan to recheck fetal growth in about 4-6 weeks. She is to do fetal kick counts 3x/daily with immediate reporting of decreased fetal movements (<10 movements/hr).         Preeclampsia prophylaxis  With her risk factors for preeclampsia including low socioeconomic status and previous low birthweight or SGA baby, discussed recommendations for low dose aspirin use to decrease risks for adverse outcomes, including preeclampsia, low birth rate and  delivery. Low-dose aspirin reduced the risk for preeclampsia by 15% in clinical trials and reduced the risk for  birth by 20% and FGR by 20%, and  mortality by around 20%.  After discussing risks/benefits of its use, it was agreed to start asa 81 mg daily today and continue until delivery. Also, recommend using in all future pregnancies.    Unilateral fetal pyelectasis  She was advised that with mild fetal pyelectasis there is increased risk of aneuploidy, two fold above what is expected based on maternal age and or quad screen risk.    Pyelectasis may be transient or physiologic in 50-70% or due to ureteropelvic junction obstruction in 10-30%, vesicoureteral reflux in 10-40%, or lower urinary tract obstruction (LUTO) in 10-15%. It has been described as a weak marker for aneuploidy, particularly with a likelihood ratio of 1.5-2.0 above what is expected based on maternal age and/or quad screen risk. The risks and benefits of genetic amniocentesis were explained to her. She declined amniocentesis . She was advised of the need for   evaluation.    I discussed and counseled on Cell free DNA understanding that it is an enhanced screening test but not a diagnostic test. It assesses risk for common aneuploidies such as Trisomy 13, 18, and 21 by evaluating cell-free fetal DNA in maternal circulation with a false positive rate less than 0.5% for Trisomy 21 and less reliable for Trisomy 13 and Trisomy 18. She declined cell free DNA .    Because mild pyelectasis may be a precursor of more significant hydronephrosis, we will plan to recheck fetal growth around 32 weeks, unless needed earlier ultrasounds for other reasons. If progressive hydronephrosis is noted, then continued follow-up antenatally and with  follow-up as needed.     If persistent pyelectasis, recommend   ultrasound 1 week after birth with pediatrician/pediatric urologist. If persistent pyelectasis, baby will need referral to pediatric urologist.    I discussed with her that antenatally as long as there is normal amniotic fluid volume then expectant management will be continued through the pregnancy. Fetal kick count instructions were given to start around 24 weeks. She was advised to report any decreased fetal movement.    Patient was counseled on Subutex use in pregnancy and of complete absence of from any unprescribed drugs.  Advised her that if there is bipolar component of her condition then she needs to get on other medication other than Lexapro for mood stabilization to avoid the risk of manic exacerbation and or postpartum psychosis.  Patient was given local resources in the community advised her to schedule an appointment with a mental health provider as soon as possible.  Importance of quitting smoking was discussed.  Patient was counseled on pyelectasis and she declined any testing at this time.  Agreed to do cervical surveillance and with a short cervix agreed to use vaginal progesterone with the patient to come back in a week to rechecked the cervix.  Patient's questions were answered.  She is in agreement with plan of care.    .  No follow-ups on file.     Future Appointments   Date Time Provider Department Center   7/15/2024 10:30 AM Santo Santiago MD [facility]        Patient was evaluated by JANENE Morgan, and and Dr. Lion.  Final assessment and recommendations as stated above were made by Dr. Lion.    This note was created with the assistance of [employer] voice recognition software. There may be transcription errors as a result of using this technology, however minimal. Effort has been made to ensure accuracy of transcription, but any obvious errors or omissions should be clarified with the author of the document.

## 2024-07-12 ENCOUNTER — PROCEDURE VISIT (OUTPATIENT)
Dept: MATERNAL FETAL MEDICINE | Facility: CLINIC | Age: 34
End: 2024-07-12
Payer: COMMERCIAL

## 2024-07-12 ENCOUNTER — OFFICE VISIT (OUTPATIENT)
Dept: MATERNAL FETAL MEDICINE | Facility: CLINIC | Age: 34
End: 2024-07-12
Payer: COMMERCIAL

## 2024-07-12 VITALS
SYSTOLIC BLOOD PRESSURE: 95 MMHG | WEIGHT: 125 LBS | DIASTOLIC BLOOD PRESSURE: 54 MMHG | BODY MASS INDEX: 20.09 KG/M2 | HEIGHT: 66 IN | HEART RATE: 66 BPM

## 2024-07-12 DIAGNOSIS — O98.512 HERPES SIMPLEX VIRUS TYPE 2 (HSV-2) INFECTION AFFECTING PREGNANCY IN SECOND TRIMESTER: ICD-10-CM

## 2024-07-12 DIAGNOSIS — Z86.69 HISTORY OF SEIZURE DISORDER: ICD-10-CM

## 2024-07-12 DIAGNOSIS — Z36.89 ENCOUNTER FOR FETAL ANATOMIC SURVEY: ICD-10-CM

## 2024-07-12 DIAGNOSIS — O09.90 AT HIGH RISK FOR COMPLICATIONS OF INTRAUTERINE PREGNANCY (IUP): ICD-10-CM

## 2024-07-12 DIAGNOSIS — O99.320 SUBOXONE MAINTENANCE TREATMENT COMPLICATING PREGNANCY, ANTEPARTUM: ICD-10-CM

## 2024-07-12 DIAGNOSIS — O28.3 ABNORMAL PRENATAL ULTRASOUND: ICD-10-CM

## 2024-07-12 DIAGNOSIS — O09.299 PRIOR PREGNANCY COMPLICATED BY IUGR, ANTEPARTUM: ICD-10-CM

## 2024-07-12 DIAGNOSIS — F11.20 SUBOXONE MAINTENANCE TREATMENT COMPLICATING PREGNANCY, ANTEPARTUM: ICD-10-CM

## 2024-07-12 DIAGNOSIS — O35.EXX0 ENCOUNTER FOR REPEAT ULTRASOUND OF FETAL PYELECTASIS IN SINGLETON PREGNANCY, ANTEPARTUM: ICD-10-CM

## 2024-07-12 DIAGNOSIS — B00.9 HERPES SIMPLEX VIRUS TYPE 2 (HSV-2) INFECTION AFFECTING PREGNANCY IN SECOND TRIMESTER: ICD-10-CM

## 2024-07-12 DIAGNOSIS — O99.320 PREGNANCY COMPLICATED BY SUBOXONE MAINTENANCE, ANTEPARTUM: ICD-10-CM

## 2024-07-12 DIAGNOSIS — O09.212 PREGNANCY COMPLICATED BY PREVIOUS PRETERM LABOR IN SECOND TRIMESTER: ICD-10-CM

## 2024-07-12 DIAGNOSIS — O99.332 MATERNAL TOBACCO USE IN SECOND TRIMESTER: ICD-10-CM

## 2024-07-12 DIAGNOSIS — O99.322 DRUG USE AFFECTING PREGNANCY IN SECOND TRIMESTER: ICD-10-CM

## 2024-07-12 DIAGNOSIS — F11.20 PREGNANCY COMPLICATED BY SUBOXONE MAINTENANCE, ANTEPARTUM: ICD-10-CM

## 2024-07-12 DIAGNOSIS — O99.342 MENTAL DISORDER AFFECTING PREGNANCY IN SECOND TRIMESTER: Primary | ICD-10-CM

## 2024-07-12 DIAGNOSIS — O26.879 SHORT CERVIX AFFECTING PREGNANCY: ICD-10-CM

## 2024-07-12 RX ORDER — ASPIRIN 81 MG/1
81 TABLET ORAL DAILY
Qty: 30 TABLET | Refills: 5 | Status: SHIPPED | OUTPATIENT
Start: 2024-07-12 | End: 2025-01-08

## 2024-07-12 RX ORDER — BUPRENORPHINE HYDROCHLORIDE 8 MG/1
TABLET SUBLINGUAL
COMMUNITY
Start: 2024-06-28

## 2024-07-12 RX ORDER — PROGESTERONE 200 MG/1
200 CAPSULE ORAL NIGHTLY
Qty: 30 CAPSULE | Refills: 4 | Status: SHIPPED | OUTPATIENT
Start: 2024-07-12 | End: 2024-12-09

## 2024-07-12 RX ORDER — ESCITALOPRAM OXALATE 20 MG/1
20 TABLET ORAL
COMMUNITY
Start: 2024-07-07

## 2024-07-12 RX ORDER — ESCITALOPRAM OXALATE 10 MG/1
10 TABLET ORAL
COMMUNITY
Start: 2024-07-07

## 2024-07-15 DIAGNOSIS — O99.320 SUBOXONE MAINTENANCE TREATMENT COMPLICATING PREGNANCY, ANTEPARTUM: Primary | ICD-10-CM

## 2024-07-15 DIAGNOSIS — F11.20 SUBOXONE MAINTENANCE TREATMENT COMPLICATING PREGNANCY, ANTEPARTUM: Primary | ICD-10-CM

## 2024-07-18 PROBLEM — O28.3 ABNORMAL PRENATAL ULTRASOUND: Status: RESOLVED | Noted: 2024-07-12 | Resolved: 2024-07-18

## 2024-07-18 NOTE — PROGRESS NOTES
Maternal Fetal Medicine Follow Up    Subjective:     Patient ID: [id number]    Chief Complaint: No chief complaint on file.      HPI: Barbara Mejia is a 33 y.o. female  at 19w4d gestation with Estimated Date of Delivery: 24  who is here for follow-up consultation by [employer].    She has history of opiate addiction (oxycodone), that started after a previous delivery.  She reports last use about 8 months ago when she presented to clinic to start Subutex.  She is currently on Subutex 8 mg daily and doing well with that  She follows with Nicky Andersen in [place].  She currently smokes cigarettes, and she reports that she is working to cut down. She has history of epilepsy diagnosed around 16 years old.  She was on medication in the past, but she has currently on no seizure medications as she reports no seizure in many years.  She has history of bipolar/schizophrenia.  She is currently on Lexapro 10 mg in the morning and 20 mg in the evening, and reports has done well with that for years.  She follows with her PCP for this.  She has history of HSV diagnosed by blood, reports never had an outbreak.  She has history of  labor and delivery in her last 3 pregnancies. She used vaginal progesterone previously.  She was noted to have relatively short cervix on consult ultrasound on 2024 and was started on nightly vaginal progesterone.  She is in cervical surveillance.  Her last 2 children were SGA weighing 4 lb 5 oz at 34 weeks and 4 lb 11 oz at 35 weeks respectively.  She had right fetal pyelectasis noted on consult ultrasound.  She declined any genetic testing.  She is on prophylactic low-dose aspirin daily.       Interval history since last [facility] [facility] [employer] visit: None.. She denies any leaking fluid, vaginal bleeding, contractions, decreased fetal movement. Denies headaches, visual disturbances, or epigastric pain.    Pregnancy complications include:   Patient Active Problem List   Diagnosis    Mental  disorder affecting pregnancy in second trimester    Maternal tobacco use    History of seizure disorder    Herpes simplex virus type 2 (HSV-2) infection affecting pregnancy in second trimester    At high risk for complications of intrauterine pregnancy (IUP)    Pregnancy complicated by previous  labor in second trimester    Prior pregnancy complicated by IUGR, antepartum    Seizure disorder during pregnancy in second trimester    Substance use disorder    Subutex maintenance treatment complicating pregnancy, antepartum    Short cervix affecting pregnancy    Encounter for repeat ultrasound of fetal pyelectasis in [place] pregnancy, antepartum       No changes to medical, surgical, family, social, or obstetric history.    Medications:  Current Outpatient Medications   Medication Instructions    aspirin (ECOTRIN) 81 mg, Oral, Daily    buprenorphine HCL (SUBUTEX) 8 mg Subl SMARTSI.5 Tablet(s) Sublingual Daily    EScitalopram oxalate (LEXAPRO) 20 mg, Oral    EScitalopram oxalate (LEXAPRO) 10 mg, Oral    PNV,calcium 72-iron,carb-folic (PRENATAL PLUS) 29 mg iron- 1 mg Tab 1 tablet, Oral, Daily    progesterone (PROMETRIUM) 200 mg, Vaginal, Nightly       Review of Systems   12 point review of systems conducted, negative except as stated in the history of present illness. See HPI for details.      Objective:     Visit Vitals  LMP 2024        Physical Exam  Vitals and nursing note reviewed.   Constitutional:       Appearance: Normal appearance.   HENT:      Head: Normocephalic and atraumatic.      Nose: Nose normal. No congestion.   Cardiovascular:      Rate and Rhythm: Normal rate.   Pulmonary:      Effort: Pulmonary effort is normal.   Skin:     Findings: No rash.   Neurological:      Mental Status: She is alert and oriented to person, place, and time.   Psychiatric:         Mood and Affect: Mood normal.         Behavior: Behavior normal.         Thought Content: Thought content normal.         Judgment:  Judgment normal.         Assessment/Plan:     33 y.o.  female with IUP at 19w4d    Substance use disorder, on Subutex  FHT present per US today (2024).     Reviewed the importance of complete abstinence from illicit drug use in pregnancy.      Recommend doing intermittent drug screens to help with compliance, with risks and benefits of drug testing discussed. May consider serial ultrasounds to check fetal growth about every 4 weeks and start  testing around 34 weeks till delivery.    I recommend alerting pediatrician to the use of methadone to be on the alert to manage  abstinence syndrome if it happens.        Maternal smoking  Reviewed with her adverse  outcomes associated with smoking in pregnancy, including miscarriage, FGR, placenta previa,  delivery, PPROM, low birthweight, and placental abruption, with associated risks of  mortality. She was again urged to quit smoking.        History of epilepsy  She has not been on any medications and has not had any seizures in several years.    There are risks to the fetus from maternal seizures. Fetal hypoxia may occur as a result of decreased placental blood flow or postictal apnea and there are risks of injury to the fetus, abruption, or miscarriage due to maternal trauma sustained during a seizure.If she has any seizures, she needs to report that immediately.  It is also recommended to avoid driving or operating heavy/hazardous equipment until 6 months post last seizure.    Fetal surveillance as above.      Reported bipolar/schizophrenia, on Lexapro  Reviewed risks with depression and risks/benefits of medication use in pregnancy.    Although discontinuing the medication for a few weeks before delivery  has been suggested, to avoid  withdrawal, the potential risks to mom and lack of proven benefit from this approach, makes continuing medication till delivery a reasonable option.     With symptom control,  reasonable to continue Lexapro at this time. She was also advised to report any worsening of symptoms or SI/HI tendencies immediately to provider/ER for prompt intervention.  She was advised to continue follow up care with her PCP for now and she was given information for Mental Health providers in the area to establish care with them.  If she has true bipolar disorder, she may benefit from mood stabilizers.  If she is doing well on Lexapro only as she reports for many years, then she may just have depression.  Advised to discuss further with mental health provider.      Previous  delivery x3 with cervical shortening  Previously discussed withdrawal of 17P by FDA and new guidelines for management of previous  delivery. She is currently in cervical length surveillance. Will continue to do TVUS intermittently until 24 weeks.  She will continue nightly vaginal progesterone until 36 weeks 6 days.  If CL less than 2.5cm, consider cerclage along with continuing vaginal progesterone.     TVUS today 2024  with closed short cervix 2.2 cm without funneling at the IO.     With cervix little shorter than last time less than 2.5 cm, discussed option of doing cerclage versus continued close cervical surveillance.  However, with her previous  deliveries at 33-36 weeks, the risks of cerclage may outweigh benefits of cerclage at this cervical length.  Discussed both options and risks and benefits associated, and patient agreed to continue vaginal progesterone with limited activity and recheck the cervix in 1 week.  PTL precautions given.       HSV in pregnancy  She is aware of the risks associated with HSV in pregnancy. Reviewed with her that the risk of asymptomatic viral shedding could lead to  transmission; however, the risk is so remote in someone with history of genital herpes not having an outbreak, that a  section is not recommended. A  section should be done for active  lesions or prodromal symptoms in labor or PROM near term.     Potential benefit of using antiherpetic treatment like Valtrex to decrease the risk of outbreak at the time of labor, as well as decrease the risk of asymptomatic viral shedding was previously addressed. Recommend start suppressive treatment with 500mg of Valtrex BID OR acyclovir 400 mg three times a day, around 32 weeks gestation (with pt's history of PTL and short cx) till delivery.        Invasive monitoring in labor could increase risk of  transmission by 6 fold. However, if clear indication for fetal scalp monitoring is present, it could reasonably be done in patient with no recurrent disease with no visible lesions.       History of SGA infants x2  Discussed potential risk of recurrence of growth restriction in subsequent pregnancies. There is normal fetal growth today.  We will plan to recheck fetal growth in about 4-6 weeks. She is to do fetal kick counts 3x/daily with immediate reporting of decreased fetal movements (<10 movements/hr).         Preeclampsia prophylaxis  With her increased risk for preeclampsia, she agreed to continue asa 81 mg daily until delivery. Preeclampsia precautions reviewed.       Right fetal pyelectasis  She declined cell free DNA . She declined amniocentesis . She is aware of the need for  evaluation.    Because mild pyelectasis may be a precursor of more significant hydronephrosis, we will plan to recheck fetal growth around 32 weeks, unless needed earlier ultrasounds for other reasons. If progressive hydronephrosis is noted, then continued follow-up antenatally and with  follow-up as needed.     If persistent pyelectasis, recommend   ultrasound 1 week after birth with pediatrician/pediatric urologist. If persistent pyelectasis, baby will need referral to pediatric urologist.    I reviewed with her that antenatally as long as there is normal amniotic fluid volume then expectant  management will be continued through the pregnancy. Fetal kick count instructions were reviewed. She was advised to report any decreased fetal movement.       Cervical length is a little shorter.  Patient reported some irregular contractions.  Advised her that there is no treatment if she goes in labor at this time.  However in the absence of contractions with a cervix continues to shorten the patient may benefit from a cerclage.  Discussed the options of the cerclage at this time and it was offered it was agreed that with previous  deliveries after 30 weeks and cervical length at 2.2 cm it might be best to continue close observation minimize activity, continue vaginal progesterone and will recheck in a week's time.  Patient was in agreement with waiting another week.  She is to report any increased vaginal discharge at any time.    No follow-ups on file.     Future Appointments   Date Time Provider Department Center   2024  2:15 PM Pedro Lion MD [facility] [address] [place]   2024  2:30 PM ROOM 2, [facility] [address] [place]   2024 10:30 AM Santo Santiago MD [facility]        KAMI involvement: Patient was evaluated and examined by Dr. Lion. IDALIA Guzman, helped in pre charting of part of note.    This note was created with the assistance of [employer] voice recognition software. There may be transcription errors as a result of using this technology, however minimal. Effort has been made to ensure accuracy of transcription, but any obvious errors or omissions should be clarified with the author of the document.

## 2024-07-19 ENCOUNTER — PROCEDURE VISIT (OUTPATIENT)
Dept: MATERNAL FETAL MEDICINE | Facility: CLINIC | Age: 34
End: 2024-07-19
Payer: COMMERCIAL

## 2024-07-19 ENCOUNTER — OFFICE VISIT (OUTPATIENT)
Dept: MATERNAL FETAL MEDICINE | Facility: CLINIC | Age: 34
End: 2024-07-19
Payer: COMMERCIAL

## 2024-07-19 VITALS
BODY MASS INDEX: 20.12 KG/M2 | WEIGHT: 125.19 LBS | HEART RATE: 76 BPM | HEIGHT: 66 IN | SYSTOLIC BLOOD PRESSURE: 106 MMHG | DIASTOLIC BLOOD PRESSURE: 62 MMHG | RESPIRATION RATE: 20 BRPM

## 2024-07-19 DIAGNOSIS — O26.879 SHORT CERVIX AFFECTING PREGNANCY: ICD-10-CM

## 2024-07-19 DIAGNOSIS — O09.212 PREGNANCY COMPLICATED BY PREVIOUS PRETERM LABOR IN SECOND TRIMESTER: ICD-10-CM

## 2024-07-19 DIAGNOSIS — O35.EXX0 ENCOUNTER FOR REPEAT ULTRASOUND OF FETAL PYELECTASIS IN SINGLETON PREGNANCY, ANTEPARTUM: ICD-10-CM

## 2024-07-19 DIAGNOSIS — B00.9 HERPES SIMPLEX VIRUS TYPE 2 (HSV-2) INFECTION AFFECTING PREGNANCY IN SECOND TRIMESTER: ICD-10-CM

## 2024-07-19 DIAGNOSIS — F11.20 SUBOXONE MAINTENANCE TREATMENT COMPLICATING PREGNANCY, ANTEPARTUM: ICD-10-CM

## 2024-07-19 DIAGNOSIS — O09.299 PRIOR PREGNANCY COMPLICATED BY IUGR, ANTEPARTUM: ICD-10-CM

## 2024-07-19 DIAGNOSIS — O99.320 SUBOXONE MAINTENANCE TREATMENT COMPLICATING PREGNANCY, ANTEPARTUM: ICD-10-CM

## 2024-07-19 DIAGNOSIS — O98.512 HERPES SIMPLEX VIRUS TYPE 2 (HSV-2) INFECTION AFFECTING PREGNANCY IN SECOND TRIMESTER: ICD-10-CM

## 2024-07-19 DIAGNOSIS — O99.342 MENTAL DISORDER AFFECTING PREGNANCY IN SECOND TRIMESTER: ICD-10-CM

## 2024-07-19 DIAGNOSIS — F19.90 SUBSTANCE USE DISORDER: ICD-10-CM

## 2024-07-19 DIAGNOSIS — O99.332 MATERNAL TOBACCO USE IN SECOND TRIMESTER: ICD-10-CM

## 2024-07-19 DIAGNOSIS — O99.352 SEIZURE DISORDER DURING PREGNANCY IN SECOND TRIMESTER: Primary | ICD-10-CM

## 2024-07-19 DIAGNOSIS — G40.909 SEIZURE DISORDER DURING PREGNANCY IN SECOND TRIMESTER: Primary | ICD-10-CM

## 2024-07-19 RX ORDER — LURASIDONE HYDROCHLORIDE 20 MG/1
20 TABLET, FILM COATED ORAL NIGHTLY
COMMUNITY
Start: 2024-07-18

## 2024-07-24 DIAGNOSIS — O99.352 SEIZURE DISORDER DURING PREGNANCY IN SECOND TRIMESTER: Primary | ICD-10-CM

## 2024-07-24 DIAGNOSIS — F11.20 SUBOXONE MAINTENANCE TREATMENT COMPLICATING PREGNANCY, ANTEPARTUM: ICD-10-CM

## 2024-07-24 DIAGNOSIS — G40.909 SEIZURE DISORDER DURING PREGNANCY IN SECOND TRIMESTER: Primary | ICD-10-CM

## 2024-07-24 DIAGNOSIS — O99.342 MENTAL DISORDER AFFECTING PREGNANCY IN SECOND TRIMESTER: ICD-10-CM

## 2024-07-24 DIAGNOSIS — O99.320 SUBOXONE MAINTENANCE TREATMENT COMPLICATING PREGNANCY, ANTEPARTUM: ICD-10-CM

## 2024-07-25 NOTE — PROGRESS NOTES
Maternal Fetal Medicine Follow Up    Subjective:     Patient ID: [id number]    Chief Complaint: [facility] follow up with US      HPI: Barbara Mejia is a 33 y.o. female  at 20w5d gestation with Estimated Date of Delivery: 24  who is here for follow-up consultation by [facility].    She has history of opiate addiction (oxycodone), that started after a previous delivery.  She reports last use over 8 months ago when she presented to clinic to start Subutex.  She is currently on Subutex 8 mg daily and doing well with that  She follows with Nicky Andersen in [place].  She currently smokes cigarettes, and she reports that she is working to cut down. She has history of epilepsy diagnosed around 16 years old.  She was on medication in the past, but she has currently on no seizure medications as she reports no seizure in many years.  She has history of bipolar/schizophrenia.  She is currently on Lexapro 10 mg in the morning and 20 mg in the evening, and reports has done well with that for years.  She follows with her PCP for this.  She has history of HSV diagnosed by blood, reports never had an outbreak.  She has history of  labor and delivery in her last 3 pregnancies. She used vaginal progesterone previously.  She was noted to have relatively short cervix on consult ultrasound on 2024 and is now on nightly vaginal progesterone.  She is in cervical surveillance.  Her last 2 children were SGA weighing 4 lb 5 oz at 34 weeks and 4 lb 11 oz at 35 weeks respectively.  She had right fetal pyelectasis noted on consult ultrasound.  She declined any genetic testing.  She is on prophylactic low-dose aspirin daily.       Interval history since last [facility] visit: None.. She denies any leaking fluid, vaginal bleeding, contractions, decreased fetal movement. Denies headaches, visual disturbances, or epigastric pain.    Pregnancy complications include:   Patient Active Problem List   Diagnosis    Mental disorder  "affecting pregnancy in second trimester    Maternal tobacco use    History of seizure disorder    Herpes simplex virus type 2 (HSV-2) infection affecting pregnancy in second trimester    At high risk for complications of intrauterine pregnancy (IUP)    Pregnancy complicated by previous  labor in second trimester    Prior pregnancy complicated by IUGR, antepartum    Seizure disorder during pregnancy in second trimester    Substance use disorder    Subutex maintenance treatment complicating pregnancy, antepartum    Short cervix affecting pregnancy    Encounter for repeat ultrasound of fetal pyelectasis in [place] pregnancy, antepartum       No changes to medical, surgical, family, social, or obstetric history.    Medications:  Current Outpatient Medications   Medication Instructions    aspirin (ECOTRIN) 81 mg, Oral, Daily    buprenorphine HCL (SUBUTEX) 8 mg Subl SMARTSI.5 Tablet(s) Sublingual Daily    EScitalopram oxalate (LEXAPRO) 20 mg, Oral    EScitalopram oxalate (LEXAPRO) 10 mg, Oral    LATUDA 20 mg, Oral, Nightly    PNV,calcium 72-iron,carb-folic (PRENATAL PLUS) 29 mg iron- 1 mg Tab 1 tablet, Oral, Daily    progesterone (PROMETRIUM) 200 mg, Vaginal, Nightly       Review of Systems   12 point review of systems conducted, negative except as stated in the history of present illness. See HPI for details.      Objective:     Visit Vitals  /62 (BP Location: Left arm, Patient Position: Sitting, BP Method: Pediatric (Automatic))   Pulse 69   Ht 5' 6" (1.676 m)   Wt 58.1 kg (128 lb)   LMP 2024   BMI 20.66 kg/m²        Physical Exam  Vitals and nursing note reviewed.   Constitutional:       Appearance: Normal appearance.   HENT:      Head: Normocephalic and atraumatic.      Nose: Nose normal. No congestion.   Cardiovascular:      Rate and Rhythm: Normal rate.   Pulmonary:      Effort: Pulmonary effort is normal.   Skin:     Findings: No rash.   Neurological:      Mental Status: She is alert and " oriented to person, place, and time.   Psychiatric:         Mood and Affect: Mood normal.         Behavior: Behavior normal.         Thought Content: Thought content normal.         Judgment: Judgment normal.         Assessment/Plan:     33 y.o.  female with IUP at 20w5d    Substance use disorder, on Subutex  FHT present per US today (2024).     Reviewed the importance of complete abstinence from illicit drug use in pregnancy.      Consider doing intermittent drug screens to help with compliance, with risks and benefits of drug testing discussed. May consider serial ultrasounds to check fetal growth about every 4 weeks and start  testing around 34 weeks till delivery.    I recommend alerting pediatrician to the use of Subutex to be on the alert to manage  abstinence syndrome if it happens.        Maternal smoking  Reviewed risks.  She was again encouraged to quit smoking.  Gave smoking cessation referral packet.      History of epilepsy  She has not been on any medications and has not had any seizures in several years.    There are risks to the fetus from maternal seizures. Fetal hypoxia may occur as a result of decreased placental blood flow or postictal apnea and there are risks of injury to the fetus, abruption, or miscarriage due to maternal trauma sustained during a seizure.If she has any seizures, she needs to report that immediately.  It is also recommended to avoid driving or operating heavy/hazardous equipment until 6 months post last seizure.    Fetal surveillance as above.      Reported bipolar/schizophrenia, on Lexapro  Reviewed risks with depression/anxiety and risks/benefits of medication use in pregnancy.    Although discontinuing the medication for a few weeks before delivery  has been suggested, to avoid  withdrawal, the potential risks to mom and lack of proven benefit from this approach, makes continuing medication till delivery a reasonable option. With  symptom control, reasonable to continue Lexapro at this time.     She was also advised to report any worsening of symptoms or SI/HI tendencies immediately to provider/ER for prompt intervention. She was advised to continue follow up care with her Mental Health provider.      Previous  delivery x3 with cervical shortening  Previously discussed withdrawal of 17P by FDA and new guidelines for management of previous  delivery. She is currently in cervical length surveillance. Will continue to do TVUS intermittently until 24 weeks.  She will continue nightly vaginal progesterone until 36 weeks 6 days.  If CL less than 2.5cm, consider cerclage along with continuing vaginal progesterone.     TVUS today 2024  with closed, stable, short cervix 2.8 cm without funneling at the IO.     Advised to continue vaginal progesterone nightly with limited activity and we will recheck the cervix in 1 week.  PTL precautions reviewed.        HSV in pregnancy  Recommend start suppressive treatment with 500mg of Valtrex BID OR acyclovir 400 mg three times a day, around 32 weeks gestation (with pt's history of PTL and short cervix) until delivery.        Invasive monitoring in labor could increase risk of  transmission by 6 fold. However, if clear indication for fetal scalp monitoring is present, it could reasonably be done in patient with no recurrent disease with no visible lesions.   A  section should be done for active lesions or prodromal symptoms in labor or PROM near term.    History of SGA infants x2  With potential risk of recurrence of growth restriction in subsequent pregnancies, we will plan to recheck fetal growth intermittently with next growth assessment to be done in about 4 weeks.     Preeclampsia prophylaxis  With her increased risk for preeclampsia, she agreed to continue asa 81 mg daily until delivery. Preeclampsia precautions reviewed.       Right fetal pyelectasis  She declined cell  free DNA . She declined amniocentesis . She is aware of the need for  evaluation.    Because mild pyelectasis may be a precursor of more significant hydronephrosis, we will plan to recheck fetal growth around 32 weeks, unless needed earlier ultrasounds for other reasons. If progressive hydronephrosis is noted, then continued follow-up antenatally and with  follow-up as needed.     If persistent pyelectasis, recommend   ultrasound 1 week after birth with pediatrician/pediatric urologist. If persistent pyelectasis, baby will need referral to pediatric urologist.    I reviewed with her that antenatally as long as there is normal amniotic fluid volume then expectant management will be continued through the pregnancy. Fetal kick count instructions were reviewed. She was advised to report any decreased fetal movement.       Follow up in about 1 week (around 2024) for MFM Followup, TVUS; 2 weeks MFM FU TVUS, Repeat US.     Future Appointments   Date Time Provider Department Center   2024 10:30 AM Santo Santiago MD [facility]      JANENE Pressley    This note was created with the assistance of [employer] voice recognition software. There may be transcription errors as a result of using this technology, however minimal. Effort has been made to ensure accuracy of transcription, but any obvious errors or omissions should be clarified with the author of the document.

## 2024-07-26 ENCOUNTER — OFFICE VISIT (OUTPATIENT)
Dept: MATERNAL FETAL MEDICINE | Facility: CLINIC | Age: 34
End: 2024-07-26
Payer: COMMERCIAL

## 2024-07-26 ENCOUNTER — PROCEDURE VISIT (OUTPATIENT)
Dept: MATERNAL FETAL MEDICINE | Facility: CLINIC | Age: 34
End: 2024-07-26
Payer: COMMERCIAL

## 2024-07-26 VITALS
DIASTOLIC BLOOD PRESSURE: 62 MMHG | HEART RATE: 69 BPM | BODY MASS INDEX: 20.57 KG/M2 | WEIGHT: 128 LBS | SYSTOLIC BLOOD PRESSURE: 114 MMHG | HEIGHT: 66 IN

## 2024-07-26 DIAGNOSIS — O09.212 PREGNANCY COMPLICATED BY PREVIOUS PRETERM LABOR IN SECOND TRIMESTER: ICD-10-CM

## 2024-07-26 DIAGNOSIS — B00.9 HERPES SIMPLEX VIRUS TYPE 2 (HSV-2) INFECTION AFFECTING PREGNANCY IN SECOND TRIMESTER: ICD-10-CM

## 2024-07-26 DIAGNOSIS — O99.332 MATERNAL TOBACCO USE IN SECOND TRIMESTER: ICD-10-CM

## 2024-07-26 DIAGNOSIS — O98.512 HERPES SIMPLEX VIRUS TYPE 2 (HSV-2) INFECTION AFFECTING PREGNANCY IN SECOND TRIMESTER: ICD-10-CM

## 2024-07-26 DIAGNOSIS — F11.20 SUBOXONE MAINTENANCE TREATMENT COMPLICATING PREGNANCY, ANTEPARTUM: ICD-10-CM

## 2024-07-26 DIAGNOSIS — O99.342 MENTAL DISORDER AFFECTING PREGNANCY IN SECOND TRIMESTER: ICD-10-CM

## 2024-07-26 DIAGNOSIS — G40.909 SEIZURE DISORDER DURING PREGNANCY IN SECOND TRIMESTER: Primary | ICD-10-CM

## 2024-07-26 DIAGNOSIS — O09.90 AT HIGH RISK FOR COMPLICATIONS OF INTRAUTERINE PREGNANCY (IUP): ICD-10-CM

## 2024-07-26 DIAGNOSIS — O26.879 SHORT CERVIX AFFECTING PREGNANCY: ICD-10-CM

## 2024-07-26 DIAGNOSIS — O99.352 SEIZURE DISORDER DURING PREGNANCY IN SECOND TRIMESTER: ICD-10-CM

## 2024-07-26 DIAGNOSIS — O09.299 PRIOR PREGNANCY COMPLICATED BY IUGR, ANTEPARTUM: ICD-10-CM

## 2024-07-26 DIAGNOSIS — O99.320 SUBOXONE MAINTENANCE TREATMENT COMPLICATING PREGNANCY, ANTEPARTUM: ICD-10-CM

## 2024-07-26 DIAGNOSIS — O99.352 SEIZURE DISORDER DURING PREGNANCY IN SECOND TRIMESTER: Primary | ICD-10-CM

## 2024-07-26 DIAGNOSIS — G40.909 SEIZURE DISORDER DURING PREGNANCY IN SECOND TRIMESTER: ICD-10-CM

## 2024-07-26 PROCEDURE — 76817 TRANSVAGINAL US OBSTETRIC: CPT | Mod: S$GLB,,, | Performed by: OBSTETRICS & GYNECOLOGY

## 2024-07-31 DIAGNOSIS — G40.909 SEIZURE DISORDER DURING PREGNANCY IN SECOND TRIMESTER: ICD-10-CM

## 2024-07-31 DIAGNOSIS — O99.322 DRUG USE AFFECTING PREGNANCY IN SECOND TRIMESTER: ICD-10-CM

## 2024-07-31 DIAGNOSIS — O09.299 PRIOR PREGNANCY COMPLICATED BY IUGR, ANTEPARTUM: Primary | ICD-10-CM

## 2024-07-31 DIAGNOSIS — O99.352 SEIZURE DISORDER DURING PREGNANCY IN SECOND TRIMESTER: ICD-10-CM

## 2024-07-31 DIAGNOSIS — O26.879 SHORT CERVIX AFFECTING PREGNANCY: ICD-10-CM

## 2024-08-02 ENCOUNTER — TELEPHONE (OUTPATIENT)
Dept: MATERNAL FETAL MEDICINE | Facility: CLINIC | Age: 34
End: 2024-08-02

## 2024-08-02 NOTE — TELEPHONE ENCOUNTER
Contacted number on file for pt and her mom answered (is on patient contact) and stated pt was sleeping she had a bad night.    Faxed ob missed appt.

## 2024-08-05 DIAGNOSIS — O99.322 DRUG USE AFFECTING PREGNANCY IN SECOND TRIMESTER: Primary | ICD-10-CM

## 2024-08-05 DIAGNOSIS — O26.879 SHORT CERVIX AFFECTING PREGNANCY: ICD-10-CM

## 2024-08-08 ENCOUNTER — TELEPHONE (OUTPATIENT)
Dept: MATERNAL FETAL MEDICINE | Facility: CLINIC | Age: 34
End: 2024-08-08
Payer: COMMERCIAL

## 2024-08-08 PROBLEM — Z86.69 HISTORY OF SEIZURE DISORDER: Status: RESOLVED | Noted: 2022-05-20 | Resolved: 2024-08-08

## 2024-08-12 NOTE — PROGRESS NOTES
Maternal Fetal Medicine Follow Up    Subjective:     Patient ID: [id number]    Chief Complaint: [facility] follow up with US      HPI: Barbara Mejia is a 33 y.o. female  at 23w2d gestation with Estimated Date of Delivery: 24  who is here for follow-up consultation by [facility].    She has history of opiate addiction (oxycodone), that started after a previous delivery.  She reports last use over 8 months ago when she presented to clinic to start Subutex.  She is currently on Subutex 8 mg daily and doing well with that  She follows with Nicky Andersen in [place].  She currently smokes cigarettes, and she reports that she is working to cut down. She was previously given smoking cessation referral. She has history of epilepsy diagnosed around 16 years old.  She was on medication in the past, but she has currently on no seizure medications as she reports no seizure in many years.  She has history of bipolar/schizophrenia.  She is currently on Lexapro 10 mg in the morning and 20 mg in the evening, and reports has done well with that for years.  She follows with her PCP for this.  She has history of HSV diagnosed by blood, reports never had an outbreak.  She has history of  labor and delivery in her last 3 pregnancies. She used vaginal progesterone previously.  She was noted to have relatively short cervix on consult ultrasound on 2024 and is now on nightly vaginal progesterone.  She is in cervical surveillance.  Her last 2 children were SGA weighing 4 lb 5 oz at 34 weeks and 4 lb 11 oz at 35 weeks respectively.  She had right fetal pyelectasis noted on consult ultrasound.  She declined any genetic testing.  She is on prophylactic low-dose aspirin daily.    She missed her followup appt last week for cervical surveillance.      Interval history since last [facility] visit: None.. She denies any leaking fluid, vaginal bleeding, contractions, decreased fetal movement. Denies headaches, visual disturbances,  "or epigastric pain.    Pregnancy complications include:   Patient Active Problem List   Diagnosis    Mental disorder affecting pregnancy in second trimester    Maternal tobacco use    Herpes simplex virus type 2 (HSV-2) infection affecting pregnancy in second trimester    At high risk for complications of intrauterine pregnancy (IUP)    Pregnancy complicated by previous  labor in second trimester    Prior pregnancy complicated by IUGR, antepartum    Seizure disorder during pregnancy in second trimester    Substance use disorder    Subutex maintenance treatment complicating pregnancy, antepartum    Short cervix affecting pregnancy    Encounter for repeat ultrasound of fetal pyelectasis in [place] pregnancy, antepartum       No changes to medical, surgical, family, social, or obstetric history.    Medications:  Current Outpatient Medications   Medication Instructions    aspirin (ECOTRIN) 81 mg, Oral, Daily    buprenorphine HCL (SUBUTEX) 8 mg Subl SMARTSI.5 Tablet(s) Sublingual Daily    EScitalopram oxalate (LEXAPRO) 20 mg, Oral    EScitalopram oxalate (LEXAPRO) 10 mg, Oral    LATUDA 20 mg, Nightly    PNV,calcium 72-iron,carb-folic (PRENATAL PLUS) 29 mg iron- 1 mg Tab 1 tablet, Oral, Daily    progesterone (PROMETRIUM) 200 mg, Vaginal, Nightly       Review of Systems   12 point review of systems conducted, negative except as stated in the history of present illness. See HPI for details.      Objective:     Visit Vitals  /60 (BP Location: Right arm, Patient Position: Sitting, BP Method: Pediatric (Automatic))   Pulse 77   Ht 5' 6" (1.676 m)   Wt 57.6 kg (127 lb)   LMP 2024   BMI 20.50 kg/m²        Physical Exam  Vitals and nursing note reviewed.   Constitutional:       Appearance: Normal appearance.   HENT:      Head: Normocephalic and atraumatic.      Nose: Nose normal. No congestion.   Cardiovascular:      Rate and Rhythm: Normal rate.   Pulmonary:      Effort: Pulmonary effort is normal. "   Skin:     Findings: No rash.   Neurological:      Mental Status: She is alert and oriented to person, place, and time.   Psychiatric:         Mood and Affect: Mood normal.         Behavior: Behavior normal.         Thought Content: Thought content normal.         Judgment: Judgment normal.       Assessment/Plan:     33 y.o.  female with IUP at 23w2d    Substance use disorder, on Subutex  There is normal fetal growth with no anomalies seen on follow-up anatomy scan on 24.  AFV is normal.  Previously noted fetal pyelectasis resolved .    Reviewed the importance of complete abstinence from illicit drug use in pregnancy.      Consider doing intermittent drug screens to help with compliance, with risks and benefits of drug testing discussed. May consider serial ultrasounds to check fetal growth about every 4 weeks and start  testing around 34 weeks until delivery.    I recommend alerting pediatrician to the use of Subutex to be on the alert to manage  abstinence syndrome if it happens.        Maternal smoking  Reviewed risks.  She was again encouraged to quit smoking.  Gave smoking cessation referral packet previously.      History of epilepsy  She has not been on any medications and has not had any seizures in several years.    There are risks to the fetus from maternal seizures. Fetal hypoxia may occur as a result of decreased placental blood flow or postictal apnea and there are risks of injury to the fetus, abruption, or miscarriage due to maternal trauma sustained during a seizure.If she has any seizures, she needs to report that immediately.  It is also recommended to avoid driving or operating heavy/hazardous equipment until 6 months post last seizure.    Fetal surveillance as above.      Reported bipolar/schizophrenia, on Lexapro  Reviewed risks with depression/anxiety and risks/benefits of medication use in pregnancy.    Although discontinuing the medication for a few weeks  before delivery  has been suggested, to avoid  withdrawal, the potential risks to mom and lack of proven benefit from this approach, makes continuing medication till delivery a reasonable option. With symptom control, reasonable to continue Lexapro at this time.     She was also advised to report any worsening of symptoms or SI/HI tendencies immediately to provider/ER for prompt intervention. She was advised to continue follow up care with her Mental Health provider.      Previous  delivery x3 with cervical shortening  Previously discussed withdrawal of 17P by FDA and new guidelines for management of previous  delivery. She is currently in cervical length surveillance. Will continue to do TVUS intermittently until 24 weeks.  .     TVUS today 2024  with closed, short cervix 2.5  cm without funneling at the IO.   PTL precautions reviewed.      With stable relatively short cervix at 2.5 cm, patient will continue vaginal progesterone until 36 weeks 6 days.   labor instructions.  Normal follow-up vaginal ultrasound at this time the      HSV in pregnancy  Recommend start suppressive treatment with 500mg of Valtrex BID OR acyclovir 400 mg three times a day, around 32 weeks gestation (with pt's history of PTL and short cervix) until delivery.        Invasive monitoring in labor could increase risk of  transmission by 6 fold. However, if clear indication for fetal scalp monitoring is present, it could reasonably be done in patient with no recurrent disease with no visible lesions.   A  section should be done for active lesions or prodromal symptoms in labor or PROM near term.      History of SGA infants x2  With potential risk of recurrence of growth restriction in subsequent pregnancies, we will plan to recheck fetal growth intermittently with next growth assessment to be done in about 8  weeks.       Preeclampsia prophylaxis  With her increased risk for preeclampsia, she  agreed to continue asa 81 mg daily until delivery. Preeclampsia precautions reviewed.       Right fetal pyelectasis, resolved   She declined cell free DNA . She declined amniocentesis . She is aware of the need for  evaluation.       Will plan to check growth around  32 weeks.      Follow up in about 8 weeks (around 10/8/2024) for MFM Followup, Repeat Ultrasound.     Future Appointments   Date Time Provider Department Center   2024 10:15 AM Santo Santiago MD [facility]      Patient was evaluated and examined by Dr. Lion. JANENE oMrgan, helped in pre charting of part of note.      This note was created with the assistance of [employer] voice recognition software. There may be transcription errors as a result of using this technology, however minimal. Effort has been made to ensure accuracy of transcription, but any obvious errors or omissions should be clarified with the author of the document.

## 2024-08-13 ENCOUNTER — OFFICE VISIT (OUTPATIENT)
Dept: MATERNAL FETAL MEDICINE | Facility: CLINIC | Age: 34
End: 2024-08-13
Payer: COMMERCIAL

## 2024-08-13 ENCOUNTER — PROCEDURE VISIT (OUTPATIENT)
Dept: MATERNAL FETAL MEDICINE | Facility: CLINIC | Age: 34
End: 2024-08-13
Payer: COMMERCIAL

## 2024-08-13 VITALS
DIASTOLIC BLOOD PRESSURE: 60 MMHG | BODY MASS INDEX: 20.41 KG/M2 | HEART RATE: 77 BPM | WEIGHT: 127 LBS | HEIGHT: 66 IN | SYSTOLIC BLOOD PRESSURE: 113 MMHG

## 2024-08-13 DIAGNOSIS — O09.299 PRIOR PREGNANCY COMPLICATED BY IUGR, ANTEPARTUM: ICD-10-CM

## 2024-08-13 DIAGNOSIS — G40.909 SEIZURE DISORDER DURING PREGNANCY IN SECOND TRIMESTER: ICD-10-CM

## 2024-08-13 DIAGNOSIS — F11.20 SUBOXONE MAINTENANCE TREATMENT COMPLICATING PREGNANCY, ANTEPARTUM: ICD-10-CM

## 2024-08-13 DIAGNOSIS — O99.322 DRUG USE AFFECTING PREGNANCY IN SECOND TRIMESTER: ICD-10-CM

## 2024-08-13 DIAGNOSIS — O09.212 PREGNANCY COMPLICATED BY PREVIOUS PRETERM LABOR IN SECOND TRIMESTER: ICD-10-CM

## 2024-08-13 DIAGNOSIS — O09.90 AT HIGH RISK FOR COMPLICATIONS OF INTRAUTERINE PREGNANCY (IUP): ICD-10-CM

## 2024-08-13 DIAGNOSIS — O26.879 SHORT CERVIX AFFECTING PREGNANCY: ICD-10-CM

## 2024-08-13 DIAGNOSIS — O98.512 HERPES SIMPLEX VIRUS TYPE 2 (HSV-2) INFECTION AFFECTING PREGNANCY IN SECOND TRIMESTER: ICD-10-CM

## 2024-08-13 DIAGNOSIS — O35.EXX0 ENCOUNTER FOR REPEAT ULTRASOUND OF FETAL PYELECTASIS IN SINGLETON PREGNANCY, ANTEPARTUM: ICD-10-CM

## 2024-08-13 DIAGNOSIS — B00.9 HERPES SIMPLEX VIRUS TYPE 2 (HSV-2) INFECTION AFFECTING PREGNANCY IN SECOND TRIMESTER: ICD-10-CM

## 2024-08-13 DIAGNOSIS — O99.352 SEIZURE DISORDER DURING PREGNANCY IN SECOND TRIMESTER: ICD-10-CM

## 2024-08-13 DIAGNOSIS — O99.352 SEIZURE DISORDER DURING PREGNANCY IN SECOND TRIMESTER: Primary | ICD-10-CM

## 2024-08-13 DIAGNOSIS — O99.320 SUBOXONE MAINTENANCE TREATMENT COMPLICATING PREGNANCY, ANTEPARTUM: ICD-10-CM

## 2024-08-13 DIAGNOSIS — G40.909 SEIZURE DISORDER DURING PREGNANCY IN SECOND TRIMESTER: Primary | ICD-10-CM

## 2024-08-13 DIAGNOSIS — O99.342 MENTAL DISORDER AFFECTING PREGNANCY IN SECOND TRIMESTER: ICD-10-CM

## 2024-10-07 DIAGNOSIS — F11.20 SUBOXONE MAINTENANCE TREATMENT COMPLICATING PREGNANCY, ANTEPARTUM: ICD-10-CM

## 2024-10-07 DIAGNOSIS — G40.909 SEIZURE DISORDER DURING PREGNANCY IN SECOND TRIMESTER: Primary | ICD-10-CM

## 2024-10-07 DIAGNOSIS — O99.352 SEIZURE DISORDER DURING PREGNANCY IN SECOND TRIMESTER: Primary | ICD-10-CM

## 2024-10-07 DIAGNOSIS — O99.322 DRUG USE AFFECTING PREGNANCY IN SECOND TRIMESTER: ICD-10-CM

## 2024-10-07 DIAGNOSIS — O09.299 PRIOR PREGNANCY COMPLICATED BY IUGR, ANTEPARTUM: ICD-10-CM

## 2024-10-07 DIAGNOSIS — O99.320 SUBOXONE MAINTENANCE TREATMENT COMPLICATING PREGNANCY, ANTEPARTUM: ICD-10-CM

## 2024-10-07 PROBLEM — O09.213 PREGNANCY COMPLICATED BY PREVIOUS PRETERM LABOR IN THIRD TRIMESTER: Status: ACTIVE | Noted: 2024-07-01

## 2024-10-07 PROBLEM — O99.353 SEIZURE DISORDER DURING PREGNANCY IN THIRD TRIMESTER: Status: ACTIVE | Noted: 2024-07-01

## 2024-10-07 PROBLEM — O98.513 HERPES SIMPLEX VIRUS TYPE 2 (HSV-2) INFECTION AFFECTING PREGNANCY IN THIRD TRIMESTER: Status: ACTIVE | Noted: 2022-05-20

## 2024-10-07 PROBLEM — O99.343 MENTAL DISORDER AFFECTING PREGNANCY IN THIRD TRIMESTER: Status: ACTIVE | Noted: 2022-05-17

## 2024-10-08 ENCOUNTER — TELEPHONE (OUTPATIENT)
Dept: MATERNAL FETAL MEDICINE | Facility: CLINIC | Age: 34
End: 2024-10-08

## 2024-10-08 NOTE — TELEPHONE ENCOUNTER
Unable to reach pt in reference to her missed appt.    Faxed missed appt notice to ob    Sent pt a missed appt letter in portal

## 2024-10-09 ENCOUNTER — HOSPITAL ENCOUNTER (INPATIENT)
Facility: HOSPITAL | Age: 34
LOS: 3 days | Discharge: HOME | DRG: 832 | End: 2024-10-12
Attending: OBSTETRICS & GYNECOLOGY | Admitting: OBSTETRICS & GYNECOLOGY
Payer: MEDICAID

## 2024-10-09 DIAGNOSIS — O09.213 PREGNANCY COMPLICATED BY PREVIOUS PRETERM LABOR IN THIRD TRIMESTER: Primary | ICD-10-CM

## 2024-10-09 DIAGNOSIS — F19.90 SUBSTANCE USE DISORDER: ICD-10-CM

## 2024-10-09 DIAGNOSIS — O47.00 PRETERM CONTRACTIONS: ICD-10-CM

## 2024-10-09 DIAGNOSIS — Z37.9 NORMAL LABOR: ICD-10-CM

## 2024-10-09 DIAGNOSIS — F11.20 SUBOXONE MAINTENANCE TREATMENT COMPLICATING PREGNANCY, ANTEPARTUM: ICD-10-CM

## 2024-10-09 DIAGNOSIS — O99.320 SUBOXONE MAINTENANCE TREATMENT COMPLICATING PREGNANCY, ANTEPARTUM: ICD-10-CM

## 2024-10-09 LAB
BASOPHILS # BLD AUTO: 0.03 X10(3)/MCL
BASOPHILS NFR BLD AUTO: 0.2 %
EOSINOPHIL # BLD AUTO: 0.3 X10(3)/MCL (ref 0–0.9)
EOSINOPHIL NFR BLD AUTO: 2.4 %
ERYTHROCYTE [DISTWIDTH] IN BLOOD BY AUTOMATED COUNT: 13 % (ref 11.5–17)
GROUP & RH: NORMAL
HCT VFR BLD AUTO: 33.9 % (ref 37–47)
HGB BLD-MCNC: 11.5 G/DL (ref 12–16)
IMM GRANULOCYTES # BLD AUTO: 0.15 X10(3)/MCL (ref 0–0.04)
IMM GRANULOCYTES NFR BLD AUTO: 1.2 %
INDIRECT COOMBS: NORMAL
LYMPHOCYTES # BLD AUTO: 3.19 X10(3)/MCL (ref 0.6–4.6)
LYMPHOCYTES NFR BLD AUTO: 25.4 %
MCH RBC QN AUTO: 31.9 PG (ref 27–31)
MCHC RBC AUTO-ENTMCNC: 33.9 G/DL (ref 33–36)
MCV RBC AUTO: 94.2 FL (ref 80–94)
MONOCYTES # BLD AUTO: 0.67 X10(3)/MCL (ref 0.1–1.3)
MONOCYTES NFR BLD AUTO: 5.3 %
NEUTROPHILS # BLD AUTO: 8.2 X10(3)/MCL (ref 2.1–9.2)
NEUTROPHILS NFR BLD AUTO: 65.5 %
NRBC BLD AUTO-RTO: 0 %
PLATELET # BLD AUTO: 221 X10(3)/MCL (ref 130–400)
PMV BLD AUTO: 11.4 FL (ref 7.4–10.4)
RBC # BLD AUTO: 3.6 X10(6)/MCL (ref 4.2–5.4)
SPECIMEN OUTDATE: NORMAL
T PALLIDUM AB SER QL: NONREACTIVE
WBC # BLD AUTO: 12.54 X10(3)/MCL (ref 4.5–11.5)

## 2024-10-09 PROCEDURE — 86900 BLOOD TYPING SEROLOGIC ABO: CPT | Performed by: OBSTETRICS & GYNECOLOGY

## 2024-10-09 PROCEDURE — 63600175 PHARM REV CODE 636 W HCPCS: Performed by: OBSTETRICS & GYNECOLOGY

## 2024-10-09 PROCEDURE — 86780 TREPONEMA PALLIDUM: CPT | Performed by: OBSTETRICS & GYNECOLOGY

## 2024-10-09 PROCEDURE — 51702 INSERT TEMP BLADDER CATH: CPT

## 2024-10-09 PROCEDURE — 25000003 PHARM REV CODE 250: Performed by: OBSTETRICS & GYNECOLOGY

## 2024-10-09 PROCEDURE — 11000001 HC ACUTE MED/SURG PRIVATE ROOM

## 2024-10-09 PROCEDURE — 86901 BLOOD TYPING SEROLOGIC RH(D): CPT | Performed by: OBSTETRICS & GYNECOLOGY

## 2024-10-09 PROCEDURE — 99285 EMERGENCY DEPT VISIT HI MDM: CPT | Mod: 25

## 2024-10-09 PROCEDURE — 96372 THER/PROPH/DIAG INJ SC/IM: CPT

## 2024-10-09 PROCEDURE — 85025 COMPLETE CBC W/AUTO DIFF WBC: CPT | Performed by: OBSTETRICS & GYNECOLOGY

## 2024-10-09 RX ORDER — PRENATAL WITH FERROUS FUM AND FOLIC ACID 3080; 920; 120; 400; 22; 1.84; 3; 20; 10; 1; 12; 200; 27; 25; 2 [IU]/1; [IU]/1; MG/1; [IU]/1; MG/1; MG/1; MG/1; MG/1; MG/1; MG/1; UG/1; MG/1; MG/1; MG/1; MG/1
1 TABLET ORAL DAILY
Status: DISCONTINUED | OUTPATIENT
Start: 2024-10-10 | End: 2024-10-12 | Stop reason: HOSPADM

## 2024-10-09 RX ORDER — BUPRENORPHINE 2 MG/1
8 TABLET SUBLINGUAL DAILY
Status: DISCONTINUED | OUTPATIENT
Start: 2024-10-10 | End: 2024-10-12 | Stop reason: HOSPADM

## 2024-10-09 RX ORDER — NAPROXEN SODIUM 220 MG/1
81 TABLET, FILM COATED ORAL
Status: COMPLETED | OUTPATIENT
Start: 2024-10-10 | End: 2024-10-10

## 2024-10-09 RX ORDER — MAGNESIUM SULFATE HEPTAHYDRATE 40 MG/ML
6 INJECTION, SOLUTION INTRAVENOUS ONCE
Status: COMPLETED | OUTPATIENT
Start: 2024-10-09 | End: 2024-10-09

## 2024-10-09 RX ORDER — BETAMETHASONE SODIUM PHOSPHATE AND BETAMETHASONE ACETATE 3; 3 MG/ML; MG/ML
12 INJECTION, SUSPENSION INTRA-ARTICULAR; INTRALESIONAL; INTRAMUSCULAR; SOFT TISSUE EVERY 24 HOURS
Status: DISCONTINUED | OUTPATIENT
Start: 2024-10-10 | End: 2024-10-09

## 2024-10-09 RX ORDER — BETAMETHASONE SODIUM PHOSPHATE AND BETAMETHASONE ACETATE 3; 3 MG/ML; MG/ML
12 INJECTION, SUSPENSION INTRA-ARTICULAR; INTRALESIONAL; INTRAMUSCULAR; SOFT TISSUE
Status: COMPLETED | OUTPATIENT
Start: 2024-10-09 | End: 2024-10-10

## 2024-10-09 RX ORDER — PENICILLIN G 3000000 [IU]/50ML
3 INJECTION, SOLUTION INTRAVENOUS
Status: DISCONTINUED | OUTPATIENT
Start: 2024-10-10 | End: 2024-10-10

## 2024-10-09 RX ORDER — MAGNESIUM SULFATE HEPTAHYDRATE 40 MG/ML
2 INJECTION, SOLUTION INTRAVENOUS CONTINUOUS
Status: DISCONTINUED | OUTPATIENT
Start: 2024-10-09 | End: 2024-10-10

## 2024-10-09 RX ORDER — SODIUM CHLORIDE 0.9 % (FLUSH) 0.9 %
10 SYRINGE (ML) INJECTION
Status: DISCONTINUED | OUTPATIENT
Start: 2024-10-09 | End: 2024-10-10

## 2024-10-09 RX ORDER — ESCITALOPRAM OXALATE 10 MG/1
10 TABLET ORAL DAILY
Status: DISCONTINUED | OUTPATIENT
Start: 2024-10-10 | End: 2024-10-12 | Stop reason: HOSPADM

## 2024-10-09 RX ORDER — ACETAMINOPHEN 10 MG/ML
15 INJECTION, SOLUTION INTRAVENOUS ONCE
Status: COMPLETED | OUTPATIENT
Start: 2024-10-09 | End: 2024-10-10

## 2024-10-09 RX ORDER — VALACYCLOVIR HYDROCHLORIDE 500 MG/1
500 TABLET, FILM COATED ORAL 2 TIMES DAILY
Status: DISCONTINUED | OUTPATIENT
Start: 2024-10-09 | End: 2024-10-12 | Stop reason: HOSPADM

## 2024-10-09 RX ORDER — SODIUM CHLORIDE, SODIUM LACTATE, POTASSIUM CHLORIDE, CALCIUM CHLORIDE 600; 310; 30; 20 MG/100ML; MG/100ML; MG/100ML; MG/100ML
INJECTION, SOLUTION INTRAVENOUS CONTINUOUS
Status: DISCONTINUED | OUTPATIENT
Start: 2024-10-09 | End: 2024-10-10

## 2024-10-09 RX ORDER — ESCITALOPRAM OXALATE 10 MG/1
20 TABLET ORAL NIGHTLY
Status: DISCONTINUED | OUTPATIENT
Start: 2024-10-09 | End: 2024-10-12 | Stop reason: HOSPADM

## 2024-10-09 RX ADMIN — ACETAMINOPHEN 910 MG: 10 INJECTION, SOLUTION INTRAVENOUS at 11:10

## 2024-10-09 RX ADMIN — ESCITALOPRAM OXALATE 20 MG: 10 TABLET ORAL at 10:10

## 2024-10-09 RX ADMIN — MAGNESIUM SULFATE HEPTAHYDRATE 2 G/HR: 40 INJECTION, SOLUTION INTRAVENOUS at 10:10

## 2024-10-09 RX ADMIN — BETAMETHASONE SODIUM PHOSPHATE AND BETAMETHASONE ACETATE 12 MG: 3; 3 INJECTION, SUSPENSION INTRA-ARTICULAR; INTRALESIONAL; INTRAMUSCULAR at 10:10

## 2024-10-09 RX ADMIN — MAGNESIUM SULFATE HEPTAHYDRATE 6 G: 40 INJECTION, SOLUTION INTRAVENOUS at 10:10

## 2024-10-09 RX ADMIN — VALACYCLOVIR 500 MG: 500 TABLET, FILM COATED ORAL at 10:10

## 2024-10-09 RX ADMIN — SODIUM CHLORIDE, POTASSIUM CHLORIDE, SODIUM LACTATE AND CALCIUM CHLORIDE: 600; 310; 30; 20 INJECTION, SOLUTION INTRAVENOUS at 09:10

## 2024-10-09 RX ADMIN — DEXTROSE MONOHYDRATE 5 MILLION UNITS: 5 INJECTION INTRAVENOUS at 09:10

## 2024-10-10 LAB
AMPHET UR QL SCN: NEGATIVE
BARBITURATE SCN PRESENT UR: NEGATIVE
BENZODIAZ UR QL SCN: NEGATIVE
CANNABINOIDS UR QL SCN: NEGATIVE
COCAINE UR QL SCN: NEGATIVE
FENTANYL UR QL SCN: NEGATIVE
MDMA UR QL SCN: NEGATIVE
OPIATES UR QL SCN: NEGATIVE
PCP UR QL: NEGATIVE
PH UR: 6.5 [PH] (ref 3–11)
SPECIFIC GRAVITY, URINE AUTO (.000) (OHS): 1.02 (ref 1–1.03)

## 2024-10-10 PROCEDURE — 25000003 PHARM REV CODE 250: Performed by: OBSTETRICS & GYNECOLOGY

## 2024-10-10 PROCEDURE — A4216 STERILE WATER/SALINE, 10 ML: HCPCS | Performed by: OBSTETRICS & GYNECOLOGY

## 2024-10-10 PROCEDURE — 80307 DRUG TEST PRSMV CHEM ANLYZR: CPT | Performed by: OBSTETRICS & GYNECOLOGY

## 2024-10-10 PROCEDURE — 63600175 PHARM REV CODE 636 W HCPCS: Performed by: OBSTETRICS & GYNECOLOGY

## 2024-10-10 PROCEDURE — 87081 CULTURE SCREEN ONLY: CPT | Performed by: NURSE PRACTITIONER

## 2024-10-10 PROCEDURE — 25000003 PHARM REV CODE 250: Performed by: NURSE PRACTITIONER

## 2024-10-10 PROCEDURE — 87653 STREP B DNA AMP PROBE: CPT | Performed by: NURSE PRACTITIONER

## 2024-10-10 PROCEDURE — 11000001 HC ACUTE MED/SURG PRIVATE ROOM

## 2024-10-10 RX ORDER — ACETAMINOPHEN 325 MG/1
650 TABLET ORAL EVERY 6 HOURS PRN
Status: DISCONTINUED | OUTPATIENT
Start: 2024-10-10 | End: 2024-10-12 | Stop reason: HOSPADM

## 2024-10-10 RX ORDER — SODIUM CHLORIDE 0.9 % (FLUSH) 0.9 %
10 SYRINGE (ML) INJECTION 2 TIMES DAILY
Status: DISCONTINUED | OUTPATIENT
Start: 2024-10-10 | End: 2024-10-12 | Stop reason: HOSPADM

## 2024-10-10 RX ORDER — NIFEDIPINE 10 MG/1
10 CAPSULE ORAL EVERY 8 HOURS
Status: COMPLETED | OUTPATIENT
Start: 2024-10-10 | End: 2024-10-11

## 2024-10-10 RX ORDER — NIFEDIPINE 10 MG/1
20 CAPSULE ORAL EVERY 8 HOURS
Status: COMPLETED | OUTPATIENT
Start: 2024-10-10 | End: 2024-10-11

## 2024-10-10 RX ADMIN — BUPRENORPHINE 8 MG: 8 TABLET SUBLINGUAL at 09:10

## 2024-10-10 RX ADMIN — NIFEDIPINE 20 MG: 10 CAPSULE ORAL at 10:10

## 2024-10-10 RX ADMIN — PENICILLIN G 3 MILLION UNITS: 3000000 INJECTION, SOLUTION INTRAVENOUS at 02:10

## 2024-10-10 RX ADMIN — NIFEDIPINE 10 MG: 10 CAPSULE ORAL at 04:10

## 2024-10-10 RX ADMIN — PENICILLIN G 3 MILLION UNITS: 3000000 INJECTION, SOLUTION INTRAVENOUS at 06:10

## 2024-10-10 RX ADMIN — ESCITALOPRAM OXALATE 10 MG: 10 TABLET ORAL at 10:10

## 2024-10-10 RX ADMIN — ACETAMINOPHEN 650 MG: 325 TABLET, FILM COATED ORAL at 08:10

## 2024-10-10 RX ADMIN — ACETAMINOPHEN 650 MG: 325 TABLET, FILM COATED ORAL at 02:10

## 2024-10-10 RX ADMIN — BETAMETHASONE SODIUM PHOSPHATE AND BETAMETHASONE ACETATE 12 MG: 3; 3 INJECTION, SUSPENSION INTRA-ARTICULAR; INTRALESIONAL; INTRAMUSCULAR at 10:10

## 2024-10-10 RX ADMIN — ACETAMINOPHEN 650 MG: 325 TABLET, FILM COATED ORAL at 06:10

## 2024-10-10 RX ADMIN — SODIUM CHLORIDE, PRESERVATIVE FREE 10 ML: 5 INJECTION INTRAVENOUS at 10:10

## 2024-10-10 RX ADMIN — VALACYCLOVIR 500 MG: 500 TABLET, FILM COATED ORAL at 08:10

## 2024-10-10 RX ADMIN — ESCITALOPRAM OXALATE 20 MG: 10 TABLET ORAL at 08:10

## 2024-10-10 RX ADMIN — NIFEDIPINE 20 MG: 10 CAPSULE ORAL at 12:10

## 2024-10-10 RX ADMIN — PRENATAL VITAMINS-IRON FUMARATE 27 MG IRON-FOLIC ACID 0.8 MG TABLET 1 TABLET: at 09:10

## 2024-10-10 RX ADMIN — ASPIRIN 81 MG CHEWABLE TABLET 81 MG: 81 TABLET CHEWABLE at 09:10

## 2024-10-10 RX ADMIN — VALACYCLOVIR 500 MG: 500 TABLET, FILM COATED ORAL at 10:10

## 2024-10-10 NOTE — PROCEDURES
LIMITED OBSTETRICAL ULTRASOUND   REPORT    DATE OF ULTRASOUND: 10/10/2024     INDICATION:   labor and Subutex use     Gestational Age: 31w4d     Limited obstetrical ultrasound was done showed fetus to be in a cephalic presentation longitudinal lie.  Placenta was posterior.  Fetal heart rate was normal at 123 beats per minute.  BPD was 7.6 cm at 30 weeks 5 days, HC was 25.8 cm at 28 weeks with suboptimal head circumference measurement, HC was 27.1 cm at 31 weeks 1 day, and FL was 5.67 cm at 29 weeks 5 days.  Estimated fetal weight of 1561 g (3 lb 7 oz) is normal at 29%.  Amniotic fluid volume was normal with a an RENETTA of 16.1.    Impression:  31 weeks and 4 days' gestation with normal fetal growth and normal amniotic fluid volume.        Pedro Lion MD       Components of this note were documented using voice recognition systems; and are subject to errors not corrected at proof reading. Please contact author for any clarifications.

## 2024-10-10 NOTE — PLAN OF CARE
Problem:  Fall Injury Risk  Goal: Absence of Fall, Infant Drop and Related Injury  Reactivated     Problem: Adult Inpatient Plan of Care  Goal: Plan of Care Review  Reactivated  Goal: Patient-Specific Goal (Individualized)  Reactivated  Goal: Absence of Hospital-Acquired Illness or Injury  Reactivated  Goal: Optimal Comfort and Wellbeing  Reactivated  Goal: Readiness for Transition of Care  Reactivated     Problem: Infection  Goal: Absence of Infection Signs and Symptoms  Reactivated

## 2024-10-10 NOTE — CONSULTS
"  Maternal Fetal Medicine Consult Note    Barbara Mejia is a 33 y.o.  female  at 31w4d  gestation who was admitted for threatened  labor. She is known patient to [facility], seen earlier this pregnancy for history of  labor in previous pregnancies. She was noted to have shortened cervix Mid July and has been on nightly vaginal progesterone. She is also on subutex for history of opioid abuse, reported abstinence since prepregnancy and follows with Nicky Gomez. She has history of HSV and is now on prophylactic valtrex, denies any symptoms at this time. We, unfortunately, have not seen her in the clinic in about 2 months as she has missed multiple appointments lately. She reports that she has been busy with her daughter who has been diagnosed with a medical issue. Per report, she was having some increased pressure in office yesterday at her appointment and had positive FFN. She reports that the vaginal pressure and cramping got worse yesterday evening, and she decided to come in for evaluation. She was noted to be 4cm dilated with BBOW on assessment, with no change overnight on recheck this morning. She is on magnesium sulfate for fetal neuroprotection. She received first dose of celestone yesterday around 2200. Currently, she reports feeling "better". She is not having as much pressure at this time, and reports contractions are better now. She denies leaking of fluid, vaginal bleeding, or decreased fetal movement.       Review of Systems   12 point review of systems conducted, negative except as stated in the history of present illness. See HPI for details.    Past Medical History:   Diagnosis Date    Anemia     Anxiety     Epilepsy     Herpes simplex virus (HSV) infection     Mental disorder     bipolar depression    Sickle cell anemia         History reviewed. No pertinent surgical history.           Family History   Problem Relation Name Age of Onset    Depression Mother      Heart " disease Mother      Depression Father      Hypertension Father          Temp:  [97.9 °F (36.6 °C)-98.1 °F (36.7 °C)] 98.1 °F (36.7 °C)  Pulse:  [66-87] 76  Resp:  [16-18] 18  SpO2:  [91 %-100 %] 96 %  BP: ()/(50-67) 98/51    Physical Exam  Vitals and nursing note reviewed.   Constitutional:       Appearance: Normal appearance.   HENT:      Head: Normocephalic and atraumatic.   Cardiovascular:      Rate and Rhythm: Normal rate and regular rhythm.   Pulmonary:      Effort: Pulmonary effort is normal. No respiratory distress.      Breath sounds: Normal breath sounds.   Abdominal:      Palpations: Abdomen is soft.      Tenderness: There is no abdominal tenderness.   Musculoskeletal:      Right lower leg: No edema.      Left lower leg: No edema.   Skin:     General: Skin is warm and dry.   Neurological:      Mental Status: She is alert and oriented to person, place, and time.   Psychiatric:         Mood and Affect: Mood normal.         Behavior: Behavior normal.         Thought Content: Thought content normal.         Judgment: Judgment normal.          Recent Results (from the past 48 hours)   Fetal Fibronectin 31.2    Collection Time: 10/08/24  1:30 PM   Result Value Ref Range    FETAL FIBRONECTIN ([facility]) Positive (A) Negative   SYPHILIS ANTIBODY (WITH REFLEX RPR)    Collection Time: 10/09/24 10:14 PM   Result Value Ref Range    Syphilis Antibody Nonreactive Nonreactive, Equivocal   Type & Screen    Collection Time: 10/09/24 10:14 PM   Result Value Ref Range    Group & Rh A POS     Indirect Ravin GEL NEG     Specimen Outdate 10/12/2024 23:59    CBC with Differential    Collection Time: 10/09/24 10:14 PM   Result Value Ref Range    WBC 12.54 (H) 4.50 - 11.50 x10(3)/mcL    RBC 3.60 (L) 4.20 - 5.40 x10(6)/mcL    Hgb 11.5 (L) 12.0 - 16.0 g/dL    Hct 33.9 (L) 37.0 - 47.0 %    MCV 94.2 (H) 80.0 - 94.0 fL    MCH 31.9 (H) 27.0 - 31.0 pg    MCHC 33.9 33.0 - 36.0 g/dL    RDW 13.0 11.5 - 17.0 %    Platelet 221 130 - 400  x10(3)/mcL    MPV 11.4 (H) 7.4 - 10.4 fL    Neut % 65.5 %    Lymph % 25.4 %    Mono % 5.3 %    Eos % 2.4 %    Basophil % 0.2 %    Lymph # 3.19 0.6 - 4.6 x10(3)/mcL    Neut # 8.20 2.1 - 9.2 x10(3)/mcL    Mono # 0.67 0.1 - 1.3 x10(3)/mcL    Eos # 0.30 0 - 0.9 x10(3)/mcL    Baso # 0.03 <=0.2 x10(3)/mcL    IG# 0.15 (H) 0 - 0.04 x10(3)/mcL    IG% 1.2 %    NRBC% 0.0 %        X-Ray Foot Complete Right  Narrative: EXAMINATION:  XR FOOT COMPLETE 3 VIEW RIGHT    CLINICAL HISTORY:  . Unspecified fall, initial encounter    TECHNIQUE:  AP, lateral and oblique views of the right foot    COMPARISON:  Radiography 2023    FINDINGS:  Minimally displaced fracture through the neck of the 4th metatarsal.  No other acute fracture identified.  Joint alignments are maintained.  Impression: Minimally displaced 4th metatarsal fracture.    Electronically signed by: Phi Cotter  Date:    2023  Time:    14:06      Assessment/Plan    31w4d with:    Threatened  labor  FHT currently 120 baseline, moderate variability, 10x10 accels, no decels, contractions about every 10-15 minutes  Cervix relatively stable with no change overnight from [id number]  No clear evidence of  labor at this time. Reasonable to transfer back to  unit with plan as below  Second celestone this evening  Ordered limited u/s for EFW this morning  NICU consult pending  After 12 hours magnesium sulfate is completed this morning, recommend transition to nifedipine 20 mg alternating with 10 mg every 4 hours until 24 hours after second steroid   She could continue vaginal progesterone nightly (will have to bring from home as it is not on hospital formulary)  Could d/c antibiotics at this time and restart if any evidence of progression in labor such as more frequent contractions, vaginal bleeding, leaking of fluid, etc.   With GBS not collected prior to starting abx, will collect now. However, if negative, we still recommend to use prophylactic PCN  in labor.  Reviewed POC with patient, answered all questions. She v/u.   PTL precautions given, with instructions to report any increased cramping, or any vaginal bleeding or leaking of fluid.       Subutex use  Continue management per Primary   Recommend alerting NICU team to the use of Subutex, with risk of ROGER.      All other issues per Primary team.        JANENE Pressley  Discussed above with Dr. Lion who provided recommendations and agreed with plan of care as above.      This note was created with the assistance of [employer] voice recognition software. There may be transcription errors as a result of using this technology, however minimal. Effort has been made to assure accuracy of transcription, but any obvious errors or omissions should be clarified with the author of the document.

## 2024-10-10 NOTE — PROGRESS NOTES
Maternal Fetal Medicine Consult Note    Barbara Mejia is a 33 y.o.  female  at 31w5d  gestation who was admitted to the hospital for threatened  labor.  She is on Procardia 20 mg alternating with 10 mg every 4 hours.  She received Celestone x2 (last dose 10/10 around 2200).  She completed a 12 hour course of magnesium sulfate around time of admission (10/9 through 10/10).  Currently, she reports no issues. She is not feeling any contractions at this time and desires to take a little walk. She also denies any leaking of fluid, vaginal bleeding, or decreased fetal movement.     Review of Systems   12 point review of systems conducted, negative except as stated in the history of present illness. See HPI for details.    Past Medical History:   Diagnosis Date    Anemia     Anxiety     Epilepsy     Herpes simplex virus (HSV) infection     Mental disorder     bipolar depression    Sickle cell anemia         History reviewed. No pertinent surgical history.           Family History   Problem Relation Name Age of Onset    Depression Mother      Heart disease Mother      Depression Father      Hypertension Father          Temp:  [98.3 °F (36.8 °C)-98.6 °F (37 °C)] 98.6 °F (37 °C)  Pulse:  [66-75] 71  Resp:  [15-20] 15  SpO2:  [96 %-99 %] 96 %  BP: ()/(52-63) 94/52    Physical Exam  Vitals and nursing note reviewed.   Constitutional:       Appearance: Normal appearance.   HENT:      Head: Normocephalic and atraumatic.   Cardiovascular:      Rate and Rhythm: Normal rate and regular rhythm.   Pulmonary:      Effort: Pulmonary effort is normal. No respiratory distress.      Breath sounds: Normal breath sounds.   Abdominal:      Palpations: Abdomen is soft.      Tenderness: There is no abdominal tenderness.   Musculoskeletal:      Right lower leg: No edema.      Left lower leg: No edema.   Skin:     General: Skin is warm and dry.   Neurological:      Mental Status: She is alert and oriented to  person, place, and time.   Psychiatric:         Mood and Affect: Mood normal.         Behavior: Behavior normal.         Thought Content: Thought content normal.         Judgment: Judgment normal.          Recent Results (from the past 48 hours)   SYPHILIS ANTIBODY (WITH REFLEX RPR)    Collection Time: 10/09/24 10:14 PM   Result Value Ref Range    Syphilis Antibody Nonreactive Nonreactive, Equivocal   Type & Screen    Collection Time: 10/09/24 10:14 PM   Result Value Ref Range    Group & Rh A POS     Indirect Ravin GEL NEG     Specimen Outdate 10/12/2024 23:59    CBC with Differential    Collection Time: 10/09/24 10:14 PM   Result Value Ref Range    WBC 12.54 (H) 4.50 - 11.50 x10(3)/mcL    RBC 3.60 (L) 4.20 - 5.40 x10(6)/mcL    Hgb 11.5 (L) 12.0 - 16.0 g/dL    Hct 33.9 (L) 37.0 - 47.0 %    MCV 94.2 (H) 80.0 - 94.0 fL    MCH 31.9 (H) 27.0 - 31.0 pg    MCHC 33.9 33.0 - 36.0 g/dL    RDW 13.0 11.5 - 17.0 %    Platelet 221 130 - 400 x10(3)/mcL    MPV 11.4 (H) 7.4 - 10.4 fL    Neut % 65.5 %    Lymph % 25.4 %    Mono % 5.3 %    Eos % 2.4 %    Basophil % 0.2 %    Lymph # 3.19 0.6 - 4.6 x10(3)/mcL    Neut # 8.20 2.1 - 9.2 x10(3)/mcL    Mono # 0.67 0.1 - 1.3 x10(3)/mcL    Eos # 0.30 0 - 0.9 x10(3)/mcL    Baso # 0.03 <=0.2 x10(3)/mcL    IG# 0.15 (H) 0 - 0.04 x10(3)/mcL    IG% 1.2 %    NRBC% 0.0 %   Drug Screen, Urine    Collection Time: 10/10/24  7:35 AM   Result Value Ref Range    Amphetamines, Urine Negative Negative    Barbiturates, Urine Negative Negative    Benzodiazepine, Urine Negative Negative    Cannabinoids, Urine Negative Negative    Cocaine, Urine Negative Negative    Fentanyl, Urine Negative Negative    MDMA, Urine Negative Negative    Opiates, Urine Negative Negative    Phencyclidine, Urine Negative Negative    pH, Urine 6.5 3.0 - 11.0    Specific Gravity, Urine Auto 1.025 1.001 - 1.035   Strep Group B by PCR    Collection Time: 10/10/24 11:27 AM   Result Value Ref Range    STREP B PCR ([facility]) GBS Presumptive Not  Detected GBS Presumptive Not Detected    STREP B CULTURE Negative Negative   Strep Only Culture    Collection Time: 10/10/24 11:27 AM    Specimen: Vagina   Result Value Ref Range    Strep Only Culture Culture In Progress         US OB Non Rad Limited 1 Or More Gestations  See Provider Notes for results.     IMPRESSION: Please see provider notes for interpretation    This procedure was auto-finalized by: Virtual Radiologist      Assessment/Plan    31w5d with:    Threatened  labor    baseline, moderate variability, 15x15 accels, no decels, rare contractions about 1 per hour   Cervix remained stable on exam last night around 4 cm   Continue the procardia 20 mg alternating with 10 mg every 4 hours until 24 hours after second steroid (tonight around 2200) then procardia 10 mg every 4 hours could be used as needed after that. I explained to her that Procardia is not known to prolong pregnancy beyond the acute tocolysis phase (1st 48 hours), but may decrease uterine irritability/cramping.  With her 4 cm but stable, will plan to monitor overnight and reassess in the morning.   Reasonable to have NST 1 hour TID and w/c ride downstairs. If any decelerations or increase in contractions, transition back to continuous monitoring.   Reviewed POC and answered all questions. Patient v/u.  Advised her to report any increased cramping or any vaginal bleeding or leaking of fluid    All other issues per primary team.    JANENE Pressley  Discussed above with Dr. Lion who provided recommendations and agreed with plan of care as above.      This note was created with the assistance of [employer] voice recognition software. There may be transcription errors as a result of using this technology, however minimal. Effort has been made to assure accuracy of transcription, but any obvious errors or omissions should be clarified with the author of the document.

## 2024-10-10 NOTE — ED PROVIDER NOTES
CAROLYN NOTE     Admit Date: 10/9/2024  CAROLYN Physician: Martínez Chapman  Primary OBGYN: Dr. Santo Santiago    Admit Diagnosis/Chief Complaint: Pelvic Pain      Chief Complaint   Patient presents with    Positive FFN     IUP 31.3 c/o was called by her doctor's office and informed of a positive fetal fibronectin test. States she feels vaginal pressure when walking.        Hospital Course:  Barbara Mejia is a 33 y.o.  at 31w3d presents complaining of vag pressure      Patient states has been complicated by opioid use in this pregnancy.  History of  deliveries.  History of HSV currently asymptomatic claims never had an outbreak    Patient denies vaginal bleeding and leakage of fluid.  Fetal Movement: normal.    /67   Pulse 72   Temp 97.9 °F (36.6 °C) (Oral)   Resp 18   LMP 2024   Temp:  [97.9 °F (36.6 °C)] 97.9 °F (36.6 °C)  Pulse:  [72] 72  Resp:  [18] 18  BP: (113)/(67) 113/67    General: in no apparent distress  Abdominal: soft, nontender, nondistended, no abnormal masses, no epigastric pain FHT present  Back: lumbar tenderness absent   CVA tenderness none  Extremeties no redness or tenderness in the calves or thighs no edema    SSE:   SVE:SVE (PeriWATCH)  Dilation (cm): 3.5  Effacement (%): 60  Station: -3  Examined by:: BG  Simplified Rivera Score: 4   No lesions    FHT:  Reactive  TOCO: Contractions irritability      LABS:   No results found for this or any previous visit (from the past 24 hours).  [unfilled]     Imaging Results    None          ASSESMENT: Barbara Mejia is a 33 y.o.   at 31w3d with suspected cervical incompetence  Overnight Observation   Fetal lung maturity steroids   Neuro protection magnesium   GBS prophylaxis  Labor precautions reviewed with patient  ER precautions reviewed with patient  Patient was given an opportunity to ask questions  Patient is to follow-up with her primary care physician        Discharge Diagnosis/Clinical  Impression**:  Suspected cervical incompetence  Status:Stable    Patient Instructions:       - Pt was given routine pregnancy instructions including to return to triage if she had any vaginal bleeding (other than spotting for the next 48hrs), any loss of fluid like her water broke, decreased fetal movement, or contractions Q 5min lasting for 2 or more hours. Pt was also instructed to drink copious water. Patient voiced understanding of all these instructions and was subsequently discharged home.    She will follow up with her primary OB.      This note was created with the assistance of [employer]. voice recognition software. There may be transcription errors as a result of using this technology however minimal. Effort has been made to assure accuracy of transcription but any obvious errors or omissions should be clarified with the author of the document.

## 2024-10-10 NOTE — H&P
[facility] - Labor and Delivery  Obstetrics  History & Physical    Patient Name: Barbara Noland  MRN: [id number]  Admission Date: 10/9/2024  Primary Care Provider: Martínez Burr III, APRN-CNP    Subjective:     Principal Problem:ptl    History of Present Illness: pt had pressure and contractions getting worse over last several days hx of ptl    Obstetric HPI:  Patient reports Date/time of onset: 10/8 getting worse contractions, active fetal movement, No vaginal bleeding , No loss of fluid     This pregnancy has been complicated by hx pt ptl  Hsv  Mental heatlh  suboxone    OB History    Para Term  AB Living   6 5 2 3 0 5   SAB IAB Ectopic Multiple Live Births   0 0 0 0 5      # Outcome Date GA Lbr Nilesh/2nd Weight Sex Type Anes PTL Lv   6 Current            5  22 35w0d 04:11 / 00:07 2.135 kg (4 lb 11.3 oz) F Vag-Spont EPI Y GEORGE      Name: CLAUDY NOLAND      Apgar1: 8  Apgar5: 9   4  08/15/20 33w6d  1.98 kg (4 lb 5.8 oz) F Vag-Spont EPI Y GEORGE      Complications: Placenta Previa   3  18 36w0d  3.135 kg (6 lb 14.6 oz) F Vag-Spont EPI Y GEORGE      Complications: Placenta Previa   2 Term 14 38w0d  3.118 kg (6 lb 14 oz) F Vag-Spont EPI N GEORGE      Complications: Abruptio Placenta   1 Term 12 38w0d  3.6 kg (7 lb 15 oz) F Vag-Spont EPI N GEORGE     Past Medical History:   Diagnosis Date    Anemia     Anxiety     Epilepsy     Herpes simplex virus (HSV) infection     Mental disorder     bipolar depression    Sickle cell anemia      History reviewed. No pertinent surgical history.    PTA Medications   Medication Sig    aspirin (ECOTRIN) 81 MG EC tablet Take 1 tablet (81 mg total) by mouth once daily.    buprenorphine HCL (SUBUTEX) 8 mg Subl SMARTSI.5 Tablet(s) Sublingual Daily    EScitalopram oxalate (LEXAPRO) 10 MG tablet Take 10 mg by mouth.    EScitalopram oxalate (LEXAPRO) 20 MG tablet Take 20 mg by mouth.    PNV,calcium 72-iron,carb-folic  (PRENATAL PLUS) 29 mg iron- 1 mg Tab Take 1 tablet by mouth once daily.    progesterone (PROMETRIUM) 200 MG capsule Place 1 capsule (200 mg total) vaginally nightly.       Review of patient's allergies indicates:  No Known Allergies     Family History       Problem Relation (Age of Onset)    Depression Mother, Father    Heart disease Mother    Hypertension Father          Tobacco Use    Smoking status: Every Day     Current packs/day: 0.50     Average packs/day: 0.5 packs/day for 15.4 years (7.7 ttl pk-yrs)     Types: Cigarettes     Start date: 2009     Passive exposure: Current    Smokeless tobacco: Never   Substance and Sexual Activity    Alcohol use: Never    Drug use: Not Currently    Sexual activity: Yes     Partners: Male     Review of Systems   Objective:     Vital Signs (Most Recent):  Temp: 98 °F (36.7 °C) (10/10/24 0638)  Pulse: 75 (10/10/24 0657)  Resp: 16 (10/09/24 2325)  BP: (!) 97/56 (10/10/24 0623)  SpO2: 97 % (10/10/24 0653) Vital Signs (24h Range):  Temp:  [97.9 °F (36.6 °C)-98 °F (36.7 °C)] 98 °F (36.7 °C)  Pulse:  [66-87] 75  Resp:  [16-18] 16  SpO2:  [91 %-100 %] 97 %  BP: ()/(50-67) 97/56     Weight: 60.8 kg (134 lb)  Body mass index is 21.63 kg/m².    FHT: 140Cat 1 (reassuring)  TOCO:  Q q10-14 minutes    Physical Exam    Cervix:  Dilation:  3  Effacement:  75%  Station: -2  Presentation: Vertex     Significant Labs:  Lab Results   Component Value Date    GROUPTRH A POS 10/09/2024    HEPBSAG Nonreactive 2024    STREPBCULT neg 2022       I have personallly reviewed all pertinent lab results from the last 24 hours.    Assessment/Plan:     33 y.o. female  at 31w4d for:    There are no hospital problems to display for this patient.      Admit to lnd for mag  Steroids  Ivf  Nicu consult  [facility]       Santo Santiago MD  Obstetrics  [facility] - Labor and Delivery

## 2024-10-11 PROCEDURE — 25000003 PHARM REV CODE 250: Performed by: OBSTETRICS & GYNECOLOGY

## 2024-10-11 PROCEDURE — 25000003 PHARM REV CODE 250: Performed by: STUDENT IN AN ORGANIZED HEALTH CARE EDUCATION/TRAINING PROGRAM

## 2024-10-11 PROCEDURE — 11000001 HC ACUTE MED/SURG PRIVATE ROOM

## 2024-10-11 PROCEDURE — A4216 STERILE WATER/SALINE, 10 ML: HCPCS | Performed by: OBSTETRICS & GYNECOLOGY

## 2024-10-11 PROCEDURE — 25000003 PHARM REV CODE 250: Performed by: NURSE PRACTITIONER

## 2024-10-11 RX ORDER — NIFEDIPINE 10 MG/1
10 CAPSULE ORAL EVERY 4 HOURS PRN
Status: DISCONTINUED | OUTPATIENT
Start: 2024-10-11 | End: 2024-10-12 | Stop reason: HOSPADM

## 2024-10-11 RX ORDER — BISACODYL 10 MG/1
10 SUPPOSITORY RECTAL DAILY PRN
Status: DISCONTINUED | OUTPATIENT
Start: 2024-10-11 | End: 2024-10-12 | Stop reason: HOSPADM

## 2024-10-11 RX ORDER — DOCUSATE SODIUM 100 MG/1
100 CAPSULE, LIQUID FILLED ORAL 2 TIMES DAILY
Status: DISCONTINUED | OUTPATIENT
Start: 2024-10-11 | End: 2024-10-12 | Stop reason: HOSPADM

## 2024-10-11 RX ADMIN — ACETAMINOPHEN 650 MG: 325 TABLET, FILM COATED ORAL at 12:10

## 2024-10-11 RX ADMIN — PRENATAL VITAMINS-IRON FUMARATE 27 MG IRON-FOLIC ACID 0.8 MG TABLET 1 TABLET: at 09:10

## 2024-10-11 RX ADMIN — VALACYCLOVIR 500 MG: 500 TABLET, FILM COATED ORAL at 09:10

## 2024-10-11 RX ADMIN — VALACYCLOVIR 500 MG: 500 TABLET, FILM COATED ORAL at 08:10

## 2024-10-11 RX ADMIN — ESCITALOPRAM OXALATE 20 MG: 10 TABLET ORAL at 08:10

## 2024-10-11 RX ADMIN — NIFEDIPINE 10 MG: 10 CAPSULE ORAL at 06:10

## 2024-10-11 RX ADMIN — NIFEDIPINE 10 MG: 10 CAPSULE ORAL at 02:10

## 2024-10-11 RX ADMIN — SODIUM CHLORIDE, PRESERVATIVE FREE 10 ML: 5 INJECTION INTRAVENOUS at 09:10

## 2024-10-11 RX ADMIN — NIFEDIPINE 20 MG: 10 CAPSULE ORAL at 04:10

## 2024-10-11 RX ADMIN — NIFEDIPINE 10 MG: 10 CAPSULE ORAL at 09:10

## 2024-10-11 RX ADMIN — BUPRENORPHINE 8 MG: 8 TABLET SUBLINGUAL at 09:10

## 2024-10-11 RX ADMIN — NIFEDIPINE 20 MG: 10 CAPSULE ORAL at 09:10

## 2024-10-11 RX ADMIN — NIFEDIPINE 20 MG: 10 CAPSULE ORAL at 05:10

## 2024-10-11 RX ADMIN — DOCUSATE SODIUM 100 MG: 100 CAPSULE, LIQUID FILLED ORAL at 06:10

## 2024-10-11 RX ADMIN — ESCITALOPRAM OXALATE 10 MG: 10 TABLET ORAL at 09:10

## 2024-10-11 NOTE — PROGRESS NOTES
Inpatient Antepartum Note    Barbara Mejia is a 33 y.o.  at 31w5d who is admitted for PTL    Complains of nothing currently. No ctx on procardia    Denies VB, LOF, painful consistent ctx, dFM    BP (!) 94/52   Pulse 76   Temp 98.6 °F (37 °C)   Resp 15   Wt 60.8 kg (134 lb)   LMP 2024   SpO2 99%   Breastfeeding No   BMI 21.63 kg/m²     NAD  Well perfused  Nonlabored breathing  Gravid NT ND    EFM reassuring      A/P:  33 y.o.  at 31w5d With    Patient Active Problem List    Diagnosis Date Noted    Short cervix affecting pregnancy 2024    Encounter for repeat ultrasound of fetal pyelectasis in [place] pregnancy, antepartum 2024    At high risk for complications of intrauterine pregnancy (IUP) 2024    Pregnancy complicated by previous  labor in third trimester 2024    Prior pregnancy complicated by IUGR, antepartum 2024    Seizure disorder during pregnancy in third trimester 2024    Substance use disorder 2024    Subutex maintenance treatment complicating pregnancy, antepartum 2024    Herpes simplex virus type 2 (HSV-2) infection affecting pregnancy in third trimester 2022     contractions 2022    Mental disorder affecting pregnancy in third trimester 2022    Maternal tobacco use 2022       Dispo: continue inpatient management, plan DC tomorrow     Kalyan Martinez MD  10/11/2024 8:23 AM

## 2024-10-11 NOTE — NURSING
Patient back in room after ambulating. Pt requested something for constipation. Will reach out to md on call

## 2024-10-12 VITALS
TEMPERATURE: 98 F | BODY MASS INDEX: 21.63 KG/M2 | DIASTOLIC BLOOD PRESSURE: 49 MMHG | WEIGHT: 134 LBS | HEART RATE: 62 BPM | SYSTOLIC BLOOD PRESSURE: 99 MMHG | OXYGEN SATURATION: 96 % | RESPIRATION RATE: 16 BRPM

## 2024-10-12 LAB
STREP B CULTURE (OHS): NEGATIVE
STREP B PCR (OHS): NORMAL

## 2024-10-12 PROCEDURE — 25000003 PHARM REV CODE 250: Performed by: STUDENT IN AN ORGANIZED HEALTH CARE EDUCATION/TRAINING PROGRAM

## 2024-10-12 PROCEDURE — 25000003 PHARM REV CODE 250: Performed by: OBSTETRICS & GYNECOLOGY

## 2024-10-12 RX ORDER — NIFEDIPINE 10 MG/1
10 CAPSULE ORAL EVERY 6 HOURS PRN
Qty: 30 CAPSULE | Refills: 0 | Status: SHIPPED | OUTPATIENT
Start: 2024-10-12 | End: 2025-10-12

## 2024-10-12 RX ADMIN — VALACYCLOVIR 500 MG: 500 TABLET, FILM COATED ORAL at 09:10

## 2024-10-12 RX ADMIN — DOCUSATE SODIUM 100 MG: 100 CAPSULE, LIQUID FILLED ORAL at 09:10

## 2024-10-12 RX ADMIN — PRENATAL VITAMINS-IRON FUMARATE 27 MG IRON-FOLIC ACID 0.8 MG TABLET 1 TABLET: at 09:10

## 2024-10-12 RX ADMIN — ESCITALOPRAM OXALATE 10 MG: 10 TABLET ORAL at 09:10

## 2024-10-12 RX ADMIN — BUPRENORPHINE 8 MG: 8 TABLET SUBLINGUAL at 09:10

## 2024-10-12 NOTE — DISCHARGE INSTRUCTIONS
Follow up with Dr. Santiago office at next scheduled appointment.   If leaking of fluid, consistent cramping, vaginal bleeding, or decreased fetal movement return to the hospital for further evaluation.

## 2024-10-12 NOTE — DISCHARGE SUMMARY
Discharge Summary  Antepartum      Primary OB Clinician: GIN Santiago MD    Discharge Provider: Kalyan Martinez MD    Admission date: 10/9/2024  Discharge date: 10/12/2024     Admit Dx:  Patient Active Problem List   Diagnosis    Mental disorder affecting pregnancy in third trimester    Maternal tobacco use    Herpes simplex virus type 2 (HSV-2) infection affecting pregnancy in third trimester     contractions    At high risk for complications of intrauterine pregnancy (IUP)    Pregnancy complicated by previous  labor in third trimester    Prior pregnancy complicated by IUGR, antepartum    Seizure disorder during pregnancy in third trimester    Substance use disorder    Subutex maintenance treatment complicating pregnancy, antepartum    Short cervix affecting pregnancy    Encounter for repeat ultrasound of fetal pyelectasis in [place] pregnancy, antepartum        Discharge Dx:    Same    Procedure: ANCS, EFM, Mg    Hospital Course:  Barbara Mejia is a 33 y.o.  at 31w6d who was admitted on 10/9/2024 for observation for PTL.     Her antepartum course was uncomplicated. Labs reassuring. EFM reassuring.  On the date of discharge, Pt in stable condition and ready for discharge.     Disposition: To home, self care    Follow Up: 1 weeks    Patient Instructions:   Standard OB precautions, will modifications for any above problems

## 2024-10-13 LAB — BACTERIA SPEC CULT: NORMAL

## 2024-10-15 ENCOUNTER — PATIENT OUTREACH (OUTPATIENT)
Dept: ADMINISTRATIVE | Facility: CLINIC | Age: 34
End: 2024-10-15
Payer: MEDICAID

## 2024-10-24 ENCOUNTER — HOSPITAL ENCOUNTER (EMERGENCY)
Facility: HOSPITAL | Age: 34
Discharge: HOME | End: 2024-10-25
Payer: MEDICAID

## 2024-10-24 VITALS
WEIGHT: 134 LBS | BODY MASS INDEX: 23.74 KG/M2 | OXYGEN SATURATION: 97 % | HEIGHT: 63 IN | TEMPERATURE: 98 F | DIASTOLIC BLOOD PRESSURE: 57 MMHG | SYSTOLIC BLOOD PRESSURE: 109 MMHG | HEART RATE: 77 BPM | RESPIRATION RATE: 18 BRPM

## 2024-10-24 LAB
BACTERIA #/AREA URNS AUTO: ABNORMAL /HPF
BILIRUB UR QL STRIP.AUTO: NEGATIVE
CLARITY UR: CLEAR
COLOR UR AUTO: ABNORMAL
CTP QC/QA: YES
GLUCOSE UR QL STRIP: NORMAL
HGB UR QL STRIP: NEGATIVE
KETONES UR QL STRIP: NEGATIVE
LEUKOCYTE ESTERASE UR QL STRIP: NEGATIVE
MUCOUS THREADS URNS QL MICRO: ABNORMAL /LPF
NITRITE UR QL STRIP: NEGATIVE
PH UR STRIP: 6 [PH]
PROT UR QL STRIP: NEGATIVE
RBC #/AREA URNS AUTO: ABNORMAL /HPF
RUPTURE OF MEMBRANE: NEGATIVE
SP GR UR STRIP.AUTO: 1.02 (ref 1–1.03)
SQUAMOUS #/AREA URNS LPF: ABNORMAL /HPF
UROBILINOGEN UR STRIP-ACNC: NORMAL
WBC #/AREA URNS AUTO: ABNORMAL /HPF

## 2024-10-24 PROCEDURE — 84112 EVAL AMNIOTIC FLUID PROTEIN: CPT

## 2024-10-24 PROCEDURE — 99284 EMERGENCY DEPT VISIT MOD MDM: CPT

## 2024-10-24 PROCEDURE — 81001 URINALYSIS AUTO W/SCOPE: CPT | Performed by: OBSTETRICS & GYNECOLOGY

## 2024-11-04 ENCOUNTER — HOSPITAL ENCOUNTER (EMERGENCY)
Facility: HOSPITAL | Age: 34
Discharge: HOME | End: 2024-11-04
Attending: OBSTETRICS & GYNECOLOGY
Payer: MEDICAID

## 2024-11-04 VITALS
RESPIRATION RATE: 16 BRPM | TEMPERATURE: 97 F | DIASTOLIC BLOOD PRESSURE: 66 MMHG | OXYGEN SATURATION: 98 % | SYSTOLIC BLOOD PRESSURE: 110 MMHG | HEART RATE: 78 BPM

## 2024-11-04 DIAGNOSIS — R10.9 ABDOMINAL PAIN, UNSPECIFIED ABDOMINAL LOCATION: Primary | ICD-10-CM

## 2024-11-04 LAB
AMORPH URATE CRY URNS QL MICRO: ABNORMAL /UL
BACTERIA #/AREA URNS AUTO: ABNORMAL /HPF
BILIRUB UR QL STRIP.AUTO: NEGATIVE
CLARITY UR: ABNORMAL
COLOR UR AUTO: ABNORMAL
GLUCOSE UR QL STRIP: NORMAL
HGB UR QL STRIP: NEGATIVE
KETONES UR QL STRIP: NEGATIVE
LEUKOCYTE ESTERASE UR QL STRIP: 25
NITRITE UR QL STRIP: NEGATIVE
PH UR STRIP: 7.5 [PH]
PROT UR QL STRIP: NEGATIVE
RBC #/AREA URNS AUTO: ABNORMAL /HPF
SP GR UR STRIP.AUTO: 1.01 (ref 1–1.03)
SQUAMOUS #/AREA URNS LPF: ABNORMAL /HPF
UROBILINOGEN UR STRIP-ACNC: NORMAL
WBC #/AREA URNS AUTO: ABNORMAL /HPF

## 2024-11-04 PROCEDURE — 99282 EMERGENCY DEPT VISIT SF MDM: CPT

## 2024-11-04 PROCEDURE — 81001 URINALYSIS AUTO W/SCOPE: CPT | Performed by: OBSTETRICS & GYNECOLOGY

## 2024-11-05 ENCOUNTER — ANESTHESIA (OUTPATIENT)
Dept: OBSTETRICS AND GYNECOLOGY | Facility: HOSPITAL | Age: 34
End: 2024-11-05
Payer: MEDICAID

## 2024-11-05 ENCOUNTER — ANESTHESIA EVENT (OUTPATIENT)
Dept: OBSTETRICS AND GYNECOLOGY | Facility: HOSPITAL | Age: 34
End: 2024-11-05
Payer: MEDICAID

## 2024-11-05 ENCOUNTER — HOSPITAL ENCOUNTER (INPATIENT)
Facility: HOSPITAL | Age: 34
LOS: 2 days | Discharge: HOME | End: 2024-11-07
Attending: OBSTETRICS & GYNECOLOGY | Admitting: OBSTETRICS & GYNECOLOGY
Payer: MEDICAID

## 2024-11-05 DIAGNOSIS — Z37.9 NORMAL LABOR: Primary | ICD-10-CM

## 2024-11-05 DIAGNOSIS — O99.320 PREGNANCY COMPLICATED BY SUBUTEX MAINTENANCE, ANTEPARTUM: ICD-10-CM

## 2024-11-05 DIAGNOSIS — O60.03 PRETERM LABOR IN THIRD TRIMESTER WITHOUT DELIVERY: ICD-10-CM

## 2024-11-05 DIAGNOSIS — O32.6XX0: ICD-10-CM

## 2024-11-05 DIAGNOSIS — F11.20 PREGNANCY COMPLICATED BY SUBUTEX MAINTENANCE, ANTEPARTUM: ICD-10-CM

## 2024-11-05 LAB
ABS NEUT (OLG): 7.77 X10(3)/MCL (ref 2.1–9.2)
AMPHET UR QL SCN: NEGATIVE
BARBITURATE SCN PRESENT UR: NEGATIVE
BENZODIAZ UR QL SCN: NEGATIVE
CANNABINOIDS UR QL SCN: NEGATIVE
COCAINE UR QL SCN: NEGATIVE
CTP QC/QA: YES
ERYTHROCYTE [DISTWIDTH] IN BLOOD BY AUTOMATED COUNT: 13 % (ref 11.5–17)
FENTANYL UR QL SCN: NEGATIVE
GROUP & RH: NORMAL
HCT VFR BLD AUTO: 34.5 % (ref 37–47)
HGB BLD-MCNC: 11.6 G/DL (ref 12–16)
INDIRECT COOMBS: NORMAL
INSTRUMENT WBC (OLG): 10.64 X10(3)/MCL
LYMPHOCYTES NFR BLD MANUAL: 2.55 X10(3)/MCL
LYMPHOCYTES NFR BLD MANUAL: 24 %
MCH RBC QN AUTO: 32.2 PG (ref 27–31)
MCHC RBC AUTO-ENTMCNC: 33.6 G/DL (ref 33–36)
MCV RBC AUTO: 95.8 FL (ref 80–94)
MDMA UR QL SCN: NEGATIVE
MONOCYTES NFR BLD MANUAL: 0.43 X10(3)/MCL (ref 0.1–1.3)
MONOCYTES NFR BLD MANUAL: 4 %
NEUTROPHILS NFR BLD MANUAL: 73 %
NRBC BLD AUTO-RTO: 0 %
OPIATES UR QL SCN: NEGATIVE
PCP UR QL: NEGATIVE
PH UR: 6.5 [PH] (ref 3–11)
PLATELET # BLD AUTO: 251 X10(3)/MCL (ref 130–400)
PLATELET # BLD EST: NORMAL 10*3/UL
PLATELETS.RETICULATED NFR BLD AUTO: 12.9 % (ref 0.9–11.2)
PMV BLD AUTO: 11.9 FL (ref 7.4–10.4)
RBC # BLD AUTO: 3.6 X10(6)/MCL (ref 4.2–5.4)
RBC MORPH BLD: NORMAL
RUPTURE OF MEMBRANE: NEGATIVE
SPECIFIC GRAVITY, URINE AUTO (.000) (OHS): 1.01 (ref 1–1.03)
SPECIMEN OUTDATE: NORMAL
T PALLIDUM AB SER QL: NONREACTIVE
WBC # BLD AUTO: 10.64 X10(3)/MCL (ref 4.5–11.5)

## 2024-11-05 PROCEDURE — 62326 NJX INTERLAMINAR LMBR/SAC: CPT | Performed by: STUDENT IN AN ORGANIZED HEALTH CARE EDUCATION/TRAINING PROGRAM

## 2024-11-05 PROCEDURE — 11000001 HC ACUTE MED/SURG PRIVATE ROOM

## 2024-11-05 PROCEDURE — 25000003 PHARM REV CODE 250: Performed by: OBSTETRICS & GYNECOLOGY

## 2024-11-05 PROCEDURE — 51702 INSERT TEMP BLADDER CATH: CPT

## 2024-11-05 PROCEDURE — 85025 COMPLETE CBC W/AUTO DIFF WBC: CPT | Performed by: OBSTETRICS & GYNECOLOGY

## 2024-11-05 PROCEDURE — 86780 TREPONEMA PALLIDUM: CPT | Performed by: OBSTETRICS & GYNECOLOGY

## 2024-11-05 PROCEDURE — 86901 BLOOD TYPING SEROLOGIC RH(D): CPT | Performed by: OBSTETRICS & GYNECOLOGY

## 2024-11-05 PROCEDURE — 63600175 PHARM REV CODE 636 W HCPCS: Performed by: OBSTETRICS & GYNECOLOGY

## 2024-11-05 PROCEDURE — 72200005 HC VAGINAL DELIVERY LEVEL II

## 2024-11-05 PROCEDURE — 10907ZC DRAINAGE OF AMNIOTIC FLUID, THERAPEUTIC FROM PRODUCTS OF CONCEPTION, VIA NATURAL OR ARTIFICIAL OPENING: ICD-10-PCS | Performed by: OBSTETRICS & GYNECOLOGY

## 2024-11-05 PROCEDURE — 63600175 PHARM REV CODE 636 W HCPCS: Mod: JZ,JG | Performed by: STUDENT IN AN ORGANIZED HEALTH CARE EDUCATION/TRAINING PROGRAM

## 2024-11-05 PROCEDURE — 80307 DRUG TEST PRSMV CHEM ANLYZR: CPT | Performed by: OBSTETRICS & GYNECOLOGY

## 2024-11-05 PROCEDURE — 25000003 PHARM REV CODE 250: Performed by: STUDENT IN AN ORGANIZED HEALTH CARE EDUCATION/TRAINING PROGRAM

## 2024-11-05 PROCEDURE — 72100002 HC LABOR CARE, 1ST 8 HOURS

## 2024-11-05 RX ORDER — MISOPROSTOL 100 UG/1
800 TABLET ORAL ONCE AS NEEDED
Status: DISCONTINUED | OUTPATIENT
Start: 2024-11-05 | End: 2024-11-07 | Stop reason: HOSPADM

## 2024-11-05 RX ORDER — SIMETHICONE 80 MG
1 TABLET,CHEWABLE ORAL 4 TIMES DAILY PRN
Status: DISCONTINUED | OUTPATIENT
Start: 2024-11-05 | End: 2024-11-05 | Stop reason: SDUPTHER

## 2024-11-05 RX ORDER — ONDANSETRON 4 MG/1
8 TABLET, ORALLY DISINTEGRATING ORAL EVERY 8 HOURS PRN
Status: DISCONTINUED | OUTPATIENT
Start: 2024-11-05 | End: 2024-11-07 | Stop reason: HOSPADM

## 2024-11-05 RX ORDER — OXYTOCIN-SODIUM CHLORIDE 0.9% IV SOLN 30 UNIT/500ML 30-0.9/5 UT/ML-%
95 SOLUTION INTRAVENOUS ONCE AS NEEDED
Status: DISCONTINUED | OUTPATIENT
Start: 2024-11-05 | End: 2024-11-07 | Stop reason: HOSPADM

## 2024-11-05 RX ORDER — ESCITALOPRAM OXALATE 10 MG/1
10 TABLET ORAL DAILY
Status: DISCONTINUED | OUTPATIENT
Start: 2024-11-06 | End: 2024-11-07 | Stop reason: HOSPADM

## 2024-11-05 RX ORDER — BUPIVACAINE HYDROCHLORIDE 2.5 MG/ML
INJECTION, SOLUTION EPIDURAL; INFILTRATION; INTRACAUDAL
Status: DISCONTINUED | OUTPATIENT
Start: 2024-11-05 | End: 2024-11-05

## 2024-11-05 RX ORDER — ESCITALOPRAM OXALATE 10 MG/1
20 TABLET ORAL NIGHTLY
Status: DISCONTINUED | OUTPATIENT
Start: 2024-11-05 | End: 2024-11-07 | Stop reason: HOSPADM

## 2024-11-05 RX ORDER — CALCIUM CARBONATE 200(500)MG
500 TABLET,CHEWABLE ORAL 3 TIMES DAILY PRN
Status: DISCONTINUED | OUTPATIENT
Start: 2024-11-05 | End: 2024-11-07 | Stop reason: HOSPADM

## 2024-11-05 RX ORDER — OXYCODONE AND ACETAMINOPHEN 5; 325 MG/1; MG/1
1 TABLET ORAL EVERY 4 HOURS PRN
Status: DISCONTINUED | OUTPATIENT
Start: 2024-11-05 | End: 2024-11-06

## 2024-11-05 RX ORDER — CARBOPROST TROMETHAMINE 250 UG/ML
250 INJECTION, SOLUTION INTRAMUSCULAR
Status: DISCONTINUED | OUTPATIENT
Start: 2024-11-05 | End: 2024-11-07 | Stop reason: HOSPADM

## 2024-11-05 RX ORDER — ACETAMINOPHEN 325 MG/1
650 TABLET ORAL EVERY 6 HOURS SCHEDULED
Status: DISCONTINUED | OUTPATIENT
Start: 2024-11-05 | End: 2024-11-07 | Stop reason: HOSPADM

## 2024-11-05 RX ORDER — DOCUSATE SODIUM 100 MG/1
200 CAPSULE, LIQUID FILLED ORAL 2 TIMES DAILY PRN
Status: DISCONTINUED | OUTPATIENT
Start: 2024-11-05 | End: 2024-11-07 | Stop reason: HOSPADM

## 2024-11-05 RX ORDER — SODIUM CHLORIDE, SODIUM LACTATE, POTASSIUM CHLORIDE, CALCIUM CHLORIDE 600; 310; 30; 20 MG/100ML; MG/100ML; MG/100ML; MG/100ML
INJECTION, SOLUTION INTRAVENOUS CONTINUOUS
Status: DISCONTINUED | OUTPATIENT
Start: 2024-11-05 | End: 2024-11-05

## 2024-11-05 RX ORDER — DIPHENOXYLATE HYDROCHLORIDE AND ATROPINE SULFATE 2.5; .025 MG/1; MG/1
2 TABLET ORAL EVERY 6 HOURS PRN
Status: DISCONTINUED | OUTPATIENT
Start: 2024-11-05 | End: 2024-11-07 | Stop reason: HOSPADM

## 2024-11-05 RX ORDER — METHYLERGONOVINE MALEATE 0.2 MG/ML
200 INJECTION INTRAVENOUS ONCE AS NEEDED
Status: DISCONTINUED | OUTPATIENT
Start: 2024-11-05 | End: 2024-11-05 | Stop reason: SDUPTHER

## 2024-11-05 RX ORDER — OXYTOCIN-SODIUM CHLORIDE 0.9% IV SOLN 30 UNIT/500ML 30-0.9/5 UT/ML-%
95 SOLUTION INTRAVENOUS CONTINUOUS PRN
Status: DISCONTINUED | OUTPATIENT
Start: 2024-11-05 | End: 2024-11-07 | Stop reason: HOSPADM

## 2024-11-05 RX ORDER — MUPIROCIN 20 MG/G
OINTMENT TOPICAL
OUTPATIENT
Start: 2024-11-05

## 2024-11-05 RX ORDER — SODIUM CHLORIDE 0.9 % (FLUSH) 0.9 %
10 SYRINGE (ML) INJECTION
Status: DISCONTINUED | OUTPATIENT
Start: 2024-11-05 | End: 2024-11-07 | Stop reason: HOSPADM

## 2024-11-05 RX ORDER — METHYLERGONOVINE MALEATE 0.2 MG/ML
200 INJECTION INTRAVENOUS ONCE AS NEEDED
Status: DISCONTINUED | OUTPATIENT
Start: 2024-11-05 | End: 2024-11-07 | Stop reason: HOSPADM

## 2024-11-05 RX ORDER — DIPHENHYDRAMINE HCL 25 MG
25 CAPSULE ORAL EVERY 4 HOURS PRN
Status: DISCONTINUED | OUTPATIENT
Start: 2024-11-05 | End: 2024-11-07 | Stop reason: HOSPADM

## 2024-11-05 RX ORDER — PRENATAL WITH FERROUS FUM AND FOLIC ACID 3080; 920; 120; 400; 22; 1.84; 3; 20; 10; 1; 12; 200; 27; 25; 2 [IU]/1; [IU]/1; MG/1; [IU]/1; MG/1; MG/1; MG/1; MG/1; MG/1; MG/1; UG/1; MG/1; MG/1; MG/1; MG/1
1 TABLET ORAL DAILY
Status: DISCONTINUED | OUTPATIENT
Start: 2024-11-06 | End: 2024-11-07 | Stop reason: HOSPADM

## 2024-11-05 RX ORDER — OXYTOCIN-SODIUM CHLORIDE 0.9% IV SOLN 30 UNIT/500ML 30-0.9/5 UT/ML-%
95 SOLUTION INTRAVENOUS CONTINUOUS PRN
Status: DISCONTINUED | OUTPATIENT
Start: 2024-11-05 | End: 2024-11-05 | Stop reason: SDUPTHER

## 2024-11-05 RX ORDER — CARBOPROST TROMETHAMINE 250 UG/ML
250 INJECTION, SOLUTION INTRAMUSCULAR
Status: DISCONTINUED | OUTPATIENT
Start: 2024-11-05 | End: 2024-11-05 | Stop reason: SDUPTHER

## 2024-11-05 RX ORDER — HYDROCORTISONE 25 MG/G
CREAM TOPICAL 3 TIMES DAILY PRN
Status: DISCONTINUED | OUTPATIENT
Start: 2024-11-05 | End: 2024-11-07 | Stop reason: HOSPADM

## 2024-11-05 RX ORDER — BUPRENORPHINE 2 MG/1
2 TABLET SUBLINGUAL 2 TIMES DAILY
Status: DISCONTINUED | OUTPATIENT
Start: 2024-11-05 | End: 2024-11-07 | Stop reason: HOSPADM

## 2024-11-05 RX ORDER — OXYCODONE AND ACETAMINOPHEN 10; 325 MG/1; MG/1
1 TABLET ORAL EVERY 4 HOURS PRN
Status: DISCONTINUED | OUTPATIENT
Start: 2024-11-05 | End: 2024-11-06

## 2024-11-05 RX ORDER — FENTANYL/BUPIVACAINE/NS/PF 2-1250MCG
PLASTIC BAG, INJECTION (ML) INJECTION CONTINUOUS
Status: DISCONTINUED | OUTPATIENT
Start: 2024-11-05 | End: 2024-11-05

## 2024-11-05 RX ORDER — DIPHENOXYLATE HYDROCHLORIDE AND ATROPINE SULFATE 2.5; .025 MG/1; MG/1
2 TABLET ORAL EVERY 6 HOURS PRN
Status: DISCONTINUED | OUTPATIENT
Start: 2024-11-05 | End: 2024-11-05 | Stop reason: SDUPTHER

## 2024-11-05 RX ORDER — DIPHENHYDRAMINE HYDROCHLORIDE 50 MG/ML
25 INJECTION INTRAMUSCULAR; INTRAVENOUS EVERY 4 HOURS PRN
Status: DISCONTINUED | OUTPATIENT
Start: 2024-11-05 | End: 2024-11-07 | Stop reason: HOSPADM

## 2024-11-05 RX ORDER — SIMETHICONE 80 MG
1 TABLET,CHEWABLE ORAL EVERY 6 HOURS PRN
Status: DISCONTINUED | OUTPATIENT
Start: 2024-11-05 | End: 2024-11-07 | Stop reason: HOSPADM

## 2024-11-05 RX ORDER — BUTORPHANOL TARTRATE 2 MG/ML
1 INJECTION INTRAMUSCULAR; INTRAVENOUS
Status: DISCONTINUED | OUTPATIENT
Start: 2024-11-05 | End: 2024-11-05

## 2024-11-05 RX ORDER — OXYTOCIN 10 [USP'U]/ML
10 INJECTION, SOLUTION INTRAMUSCULAR; INTRAVENOUS ONCE AS NEEDED
Status: DISCONTINUED | OUTPATIENT
Start: 2024-11-05 | End: 2024-11-07 | Stop reason: HOSPADM

## 2024-11-05 RX ORDER — ONDANSETRON 4 MG/1
8 TABLET, ORALLY DISINTEGRATING ORAL EVERY 8 HOURS PRN
Status: DISCONTINUED | OUTPATIENT
Start: 2024-11-05 | End: 2024-11-05 | Stop reason: SDUPTHER

## 2024-11-05 RX ORDER — OXYTOCIN-SODIUM CHLORIDE 0.9% IV SOLN 30 UNIT/500ML 30-0.9/5 UT/ML-%
10 SOLUTION INTRAVENOUS ONCE AS NEEDED
Status: DISCONTINUED | OUTPATIENT
Start: 2024-11-05 | End: 2024-11-05

## 2024-11-05 RX ORDER — EPHEDRINE SULFATE 50 MG/ML
10 INJECTION, SOLUTION INTRAVENOUS ONCE AS NEEDED
Status: DISCONTINUED | OUTPATIENT
Start: 2024-11-05 | End: 2024-11-05

## 2024-11-05 RX ORDER — OXYTOCIN-SODIUM CHLORIDE 0.9% IV SOLN 30 UNIT/500ML 30-0.9/5 UT/ML-%
10 SOLUTION INTRAVENOUS ONCE AS NEEDED
Status: DISCONTINUED | OUTPATIENT
Start: 2024-11-05 | End: 2024-11-07 | Stop reason: HOSPADM

## 2024-11-05 RX ORDER — LIDOCAINE HYDROCHLORIDE 10 MG/ML
10 INJECTION, SOLUTION INFILTRATION; PERINEURAL ONCE AS NEEDED
Status: DISCONTINUED | OUTPATIENT
Start: 2024-11-05 | End: 2024-11-05

## 2024-11-05 RX ORDER — OXYTOCIN 10 [USP'U]/ML
10 INJECTION, SOLUTION INTRAMUSCULAR; INTRAVENOUS ONCE AS NEEDED
Status: COMPLETED | OUTPATIENT
Start: 2024-11-05 | End: 2024-11-05

## 2024-11-05 RX ORDER — IBUPROFEN 600 MG/1
600 TABLET ORAL EVERY 6 HOURS PRN
Status: DISCONTINUED | OUTPATIENT
Start: 2024-11-05 | End: 2024-11-07 | Stop reason: HOSPADM

## 2024-11-05 RX ADMIN — Medication: at 05:11

## 2024-11-05 RX ADMIN — BUPIVACAINE HYDROCHLORIDE 10 ML: 2.5 INJECTION, SOLUTION EPIDURAL; INFILTRATION; INTRACAUDAL; PERINEURAL at 03:11

## 2024-11-05 RX ADMIN — Medication 5 ML: at 03:11

## 2024-11-05 RX ADMIN — OXYTOCIN 10 UNITS: 10 INJECTION, SOLUTION INTRAMUSCULAR; INTRAVENOUS at 04:11

## 2024-11-05 RX ADMIN — IBUPROFEN 600 MG: 600 TABLET, FILM COATED ORAL at 05:11

## 2024-11-05 RX ADMIN — BUPIVACAINE HYDROCHLORIDE 5 ML: 2.5 INJECTION, SOLUTION EPIDURAL; INFILTRATION; INTRACAUDAL; PERINEURAL at 03:11

## 2024-11-05 RX ADMIN — ACETAMINOPHEN 325MG 650 MG: 325 TABLET ORAL at 10:11

## 2024-11-05 RX ADMIN — SODIUM CHLORIDE, POTASSIUM CHLORIDE, SODIUM LACTATE AND CALCIUM CHLORIDE 500 ML: 600; 310; 30; 20 INJECTION, SOLUTION INTRAVENOUS at 02:11

## 2024-11-05 RX ADMIN — ESCITALOPRAM OXALATE 20 MG: 10 TABLET ORAL at 10:11

## 2024-11-05 NOTE — ED NOTES
"Patient refused to listen to discharge instructions and walked off unit prior to discharge instructions. Discussed discharge instructions with patient's mother who also was walking away during instructions and stated "It's her 6th baby she doesn't need that, she knows". Attempted to give  labor precautions and reasons to return to hospital such as leaking of fluid, decreased fetal movement, contractions, or worsening abdominal pain. Patient's mother stated "Her water does not break and she does not contract".   "

## 2024-11-05 NOTE — ED PROVIDER NOTES
CAROLYN NOTE     Admit Date: 2024  CAROLYN Physician: Jack Pickens  Primary OBGYN:Dr. Santo Santiago    Admit Diagnosis/Chief Complaint: Contractions      No chief complaint on file.      HPI:  Barbara Mejia is a 33 y.o.  at 35w2d presents complaining of contractions since approximately 5:30 a.m. this morning.  She has a history of  delivery.      Patient states has been complicated by history of  delivery, Subutex use and history of bipolar depression and genital HSV in the past and is currently on suppressive meds in this pregnancy.     PMHx:   Past Medical History:   Diagnosis Date    Anemia     Anxiety     Epilepsy     Herpes simplex virus (HSV) infection     Mental disorder     bipolar depression    Sickle cell anemia        PSHx: No past surgical history on file.    All: Review of patient's allergies indicates:  No Known Allergies    Meds: No current facility-administered medications for this encounter.  No current outpatient medications on file.    SH:   Social History     Socioeconomic History    Marital status: Single   Tobacco Use    Smoking status: Every Day     Current packs/day: 0.50     Average packs/day: 0.5 packs/day for 15.5 years (7.7 ttl pk-yrs)     Types: Cigarettes     Start date: 2009     Passive exposure: Current    Smokeless tobacco: Never   Substance and Sexual Activity    Alcohol use: Never    Drug use: Not Currently    Sexual activity: Not Currently     Partners: Male       FH:   Family History   Problem Relation Name Age of Onset    Depression Mother      Heart disease Mother      Depression Father      Hypertension Father         OBHx:   OB History    Para Term  AB Living   6 5 2 3 0 5   SAB IAB Ectopic Multiple Live Births   0 0 0 0 5      # Outcome Date GA Lbr Nilesh/2nd Weight Sex Type Anes PTL Lv   6 Current            5  / 35w0d 04:11 / 00:07 2.135 kg (4 lb 11.3 oz) F Vag-Spont EPI Y GEORGE      Name: NUZHAT,GIRL  KAT      Apgar1: 8  Apgar5: 9   4  08/15/20 33w6d  1.98 kg (4 lb 5.8 oz) F Vag-Spont EPI Y GEORGE      Complications: Placenta Previa   3  18 36w0d  3.135 kg (6 lb 14.6 oz) F Vag-Spont EPI Y GEORGE      Complications: Placenta Previa   2 Term 14 38w0d  3.118 kg (6 lb 14 oz) F Vag-Spont EPI N GEORGE      Complications: Abruptio Placenta   1 Term 12 38w0d  3.6 kg (7 lb 15 oz) F Vag-Spont EPI N GEORGE       Patient denies vaginal bleeding, leakage of fluid, headache, vision changes, RUQ pain, dysuria, fever, and nausea/vomiting.  Fetal Movement: normal.    LMP 2024   Breastfeeding No   Temp:  [97.3 °F (36.3 °C)] 97.3 °F (36.3 °C)  Pulse:  [71-86] 78  Resp:  [16] 16  SpO2:  [98 %-100 %] 98 %  BP: (110)/(66) 110/66    General: in no apparent distress well developed and well nourished  Cardiovascular: Regular rate and rhythm  Lungs: Clear to auscultation bilaterally  Abdominal: soft, nontender, nondistended, no abnormal masses, no epigastric pain fundus soft, nontender consistent with 34 weeks size FHT present  Back: lumbar tenderness absent   CVA tenderness none  Extremeties no redness or tenderness in the calves or thighs no edema DTRs +1 over 4 bilaterally at the knee      SVE:  6//70%-2 station vertex and able to feel fetal fingers just at the left lateral aspect.  Partial compound presentation at this time.    FHT:  Reactive, Category 1, and Reassuring for gestational age  TOCO: Contractions every 2-4 min    Prenatal labs:  A, Rh positive, Rubella immune, Hepatitis-B surface antigen negative, HIV negative, RPR negative, and Group B strep unknown    LABS:     Recent Results (from the past 24 hours)   Urinalysis, Reflex to Urine Culture    Collection Time: 24  6:48 PM    Specimen: Urine   Result Value Ref Range    Color, UA Light-Yellow Yellow, Light-Yellow, Colorless, Straw, Dark-Yellow    Appearance, UA Turbid (A) Clear    Specific Gravity, UA 1.013 1.005 - 1.030    pH, UA 7.5 5.0  - 8.5    Protein, UA Negative Negative    Glucose, UA Normal Negative, Normal    Ketones, UA Negative Negative    Blood, UA Negative Negative    Bilirubin, UA Negative Negative    Urobilinogen, UA Normal 0.2, 1.0, Normal    Nitrites, UA Negative Negative    Leukocyte Esterase, UA 25 (A) Negative    RBC, UA 0-5 None Seen, 0-2, 3-5, 0-5 /HPF    WBC, UA 0-5 None Seen, 0-2, 3-5, 0-5 /HPF    Bacteria, UA None Seen None Seen, Trace /HPF    Squamous Epithelial Cells, UA Trace None Seen, Trace, Rare /HPF    Amorphous Crystal, UA Few (A) None Seen /uL         Imaging Results    None          ASSESMENT: Barbara Mejia is a 33 y.o.   at 35w2d with  labor and advanced dilation with partial compound presentation.    Plan: Admit to primary OB for delivery.  He is aware of the findings on vaginal exam and he will come and assess the patient for route of delivery.          Admission Diagnosis/Clinical Impression:  :  Normal labor (Primary)  Compound presentation of fetus palpable vaginally, single or unspecified fetus   labor in third trimester without delivery  Pregnancy complicated by subutex maintenance, antepartum    Status:Stable      This note was created with the assistance of [employer] voice recognition software. There may be transcription errors as a result of using this technology however minimal. Effort has been made to assure accuracy of transcription but any obvious errors or omissions should be clarified with the author of the document.

## 2024-11-05 NOTE — NURSING
"Patient and mother walking out of patient room into hallway. RN stopped patient and stated MD states we will keep patient for further observation until 2200. At that time if cervix is unchanged then patient may be discharged home. Patient states "No, I'm leaving". RN requests that patient return to room so discharge instructions may be given orally as well as written instructions. Patient states "NO I'm leaving" and continued to leave unit without receiving discharge instructions.   "

## 2024-11-05 NOTE — ED PROVIDER NOTES
Encounter Date: 2024       History     Chief Complaint   Patient presents with    Vaginal Pressure     Iup at 35.1 with c/o vaginal pressure starting at 0600       HPI  Review of patient's allergies indicates:  No Known Allergies  Past Medical History:   Diagnosis Date    Anemia     Anxiety     Epilepsy     Herpes simplex virus (HSV) infection     Mental disorder     bipolar depression    Sickle cell anemia      History reviewed. No pertinent surgical history.  Family History   Problem Relation Name Age of Onset    Depression Mother      Heart disease Mother      Depression Father      Hypertension Father       Social History     Tobacco Use    Smoking status: Every Day     Current packs/day: 0.50     Average packs/day: 0.5 packs/day for 15.5 years (7.7 ttl pk-yrs)     Types: Cigarettes     Start date: 2009     Passive exposure: Current    Smokeless tobacco: Never   Substance Use Topics    Alcohol use: Never    Drug use: Not Currently     Review of Systems    Physical Exam     Initial Vitals   BP Pulse Resp Temp SpO2   24 1802 24 1801 24 1802 24 1802 24 1801   110/66 80 16 97.3 °F (36.3 °C) 100 %      MAP       --                Physical Exam    ED Course   Procedures  Labs Reviewed   URINALYSIS, REFLEX TO URINE CULTURE          Imaging Results    None          Medications - No data to display  Medical Decision Making                                    Clinical Impression:   This  @ 35 1/7 weeks gestation presents with complaints of contractions. She has been noted jose alfredo be 3-4 as a multiparous cervix  Reports +FM, no LOF, no UB, +CTX  Denies any urinary symptoms or complications this pregnancy    VE: 3/70/-3  Tracing: reactive    A/P: Rule out labor  -no cervical change made in 90 mins but patient insisted and put up a resistance to leave, will keep an addition 2.5 hours and reassess  -discharge home if no cervical change made  -UA pending: patient           Iron Jarred  MD HOLLI  11/04/24 1920

## 2024-11-05 NOTE — L&D DELIVERY NOTE
"[facility] - Labor and Delivery  Vaginal Delivery   Operative Note    SUMMARY     Normal spontaneous vaginal delivery of live infant, skin to skin was unable to be performed due to mec .  Infant delivered position NATALYA over intact perineum.  Nuchal cord: No.    Spontaneous delivery of placenta and IV pitocin given noting good uterine tone.  No lacerations noted.  Patient tolerated delivery well. Sponge needle and lap counted correctly x2.    Indications: <principal problem not specified>  Pregnancy complicated by:   Patient Active Problem List   Diagnosis    Mental disorder affecting pregnancy in third trimester    Maternal tobacco use    Herpes simplex virus type 2 (HSV-2) infection affecting pregnancy in third trimester     contractions    At high risk for complications of intrauterine pregnancy (IUP)    Pregnancy complicated by previous  labor in third trimester    Prior pregnancy complicated by IUGR, antepartum    Seizure disorder during pregnancy in third trimester    Substance use disorder    Subutex maintenance treatment complicating pregnancy, antepartum    Short cervix affecting pregnancy    Encounter for repeat ultrasound of fetal pyelectasis in [place] pregnancy, antepartum     Admitting GA: 35w2d    Delivery Information for Erin Mejia    Birth information:  YOB: 2023   Time of birth: 4:01 PM   Sex: female   Head Delivery Date/Time: 2024  4:01 PM   Delivery type: Vaginal, Spontaneous   Gestational Age: 35w2d       Delivery Providers    Delivering clinician: Santo Santiago MD   Provider Role    Heydi Dubois RN Registered Nurse    Gillian Marion RN Registered Nurse    Minna Carr RN Registered Nurse              Measurements    Weight: 2450 g  Weight (lbs): 5 lb 6.4 oz  Length: 45.7 cm  Length (in): 18"  Head circumference: 30.5 cm         Apgars    Living status: Living  Apgar Component Scores:  1 min.:  5 min.:  10 min.:  15 min.:  20 " min.:    Skin color:  1  1       Heart rate:  2  2       Reflex irritability:  2  2       Muscle tone:  2  2       Respiratory effort:  2  2       Total:  9  9       Apgars assigned by: MICHA ARCHIBALD RN         Operative Delivery    Forceps attempted?: No  Vacuum extractor attempted?: No         Shoulder Dystocia    Shoulder dystocia present?: No           Presentation    Presentation: Vertex  Position: Occiput Anterior           Interventions/Resuscitation    Method: Bulb Suctioning, Tactile Stimulation, Deep Suctioning, NICU Attended       Cord    Vessels: 3 vessels  Complications: None  Delayed Cord Clamping?: Yes  Cord Clamped Date/Time: 2024  4:01 PM  Cord Blood Disposition: Sent with Baby  Gases Sent?: No  Stem Cell Collection (by MD): No       Placenta    Placenta delivery date/time: 2024 1604  Placenta removal: Spontaneous  Placenta appearance: Intact  Placenta disposition: Discarded           Labor Events:       labor: Yes     Labor Onset Date/Time: 2024 13:00     Dilation Complete Date/Time: 2024 15:57     Start Pushing Date/Time: 2024 15:58     Rupture Date/Time: 24 1511        Rupture type: ARM (Artificial Rupture)        Fluid Amount:       Fluid Color: Clear      steroids: Partial Course     Antibiotics given for GBS: No     Induction:       Indications for induction:        Augmentation:       Indications for augmentation:       Labor complications: None     Additional complications:          Cervical ripening:                     Delivery:      Episiotomy: None     Indication for Episiotomy:       Perineal Lacerations: None Repaired:      Periurethral Laceration:   Repaired:     Labial Laceration:   Repaired:     Sulcus Laceration:   Repaired:     Vaginal Laceration:   Repaired:     Cervical Laceration:   Repaired:     Repair suture: None     Repair # of packets:       Last Value - EBL - Nursing (mL):       Sum - EBL - Nursing (mL): 0     Last Value -  EBL - Anesthesia (mL):      Calculated QBL (mL): 185     Running total QBL (mL): 185     Vaginal Sweep Performed: No     Surgicount Correct: Yes       Other providers:       Anesthesia    Method: Epidural          Details (if applicable):  Trial of Labor      Categorization:      Priority:     Indications for :     Incision Type:       Additional  information:  Forceps:    Vacuum:    Breech:    Observed anomalies    Other (Comments):

## 2024-11-05 NOTE — H&P
[facility] - Labor and Delivery  Obstetrics  History & Physical    Patient Name: Barbara Noland  MRN: [id number]  Admission Date: 2024  Primary Care Provider: Martínez Burr III, APRN-CNP    Subjective:     Principal Problem:pain and contractions since yesterday was sent home from ob ed yesterday    History of Present Illness: pain     Obstetric HPI:  Patient reports Date/time of onset: two days ago getting worse contractions, active fetal movement, Yes vaginal bleeding , Yes loss of fluid     This pregnancy has been complicated by hx of ptl  suboxonne    OB History    Para Term  AB Living   6 5 2 3 0 5   SAB IAB Ectopic Multiple Live Births   0 0 0 0 5      # Outcome Date GA Lbr Nilesh/2nd Weight Sex Type Anes PTL Lv   6 Current            5  22 35w0d 04:11 / 00:07 2.135 kg (4 lb 11.3 oz) F Vag-Spont EPI Y GEORGE      Name: CLAUDY NOLAND      Apgar1: 8  Apgar5: 9   4  08/15/20 33w6d  1.98 kg (4 lb 5.8 oz) F Vag-Spont EPI Y GEORGE      Complications: Placenta Previa   3  18 36w0d  3.135 kg (6 lb 14.6 oz) F Vag-Spont EPI Y GEORGE      Complications: Placenta Previa   2 Term 14 38w0d  3.118 kg (6 lb 14 oz) F Vag-Spont EPI N GEORGE      Complications: Abruptio Placenta   1 Term 12 38w0d  3.6 kg (7 lb 15 oz) F Vag-Spont EPI N GEORGE     Past Medical History:   Diagnosis Date    Anemia     Anxiety     Epilepsy     Herpes simplex virus (HSV) infection     Mental disorder     bipolar depression    Sickle cell anemia      No past surgical history on file.    PTA Medications   Medication Sig    aspirin (ECOTRIN) 81 MG EC tablet Take 1 tablet (81 mg total) by mouth once daily.    buprenorphine HCL (SUBUTEX) 8 mg Subl SMARTSI.5 Tablet(s) Sublingual Daily    EScitalopram oxalate (LEXAPRO) 10 MG tablet Take 10 mg by mouth.    EScitalopram oxalate (LEXAPRO) 20 MG tablet Take 20 mg by mouth.    PNV,calcium 72-iron,carb-folic (PRENATAL PLUS) 29 mg iron-  1 mg Tab Take 1 tablet by mouth once daily.    NIFEdipine (PROCARDIA) 10 MG Cap Take 1 capsule (10 mg total) by mouth every 6 (six) hours as needed (contractions).    progesterone (PROMETRIUM) 200 MG capsule Place 1 capsule (200 mg total) vaginally nightly.       Review of patient's allergies indicates:  No Known Allergies     Family History       Problem Relation (Age of Onset)    Depression Mother, Father    Heart disease Mother    Hypertension Father          Tobacco Use    Smoking status: Every Day     Current packs/day: 0.50     Average packs/day: 0.5 packs/day for 15.5 years (7.7 ttl pk-yrs)     Types: Cigarettes     Start date: 2009     Passive exposure: Current    Smokeless tobacco: Never   Substance and Sexual Activity    Alcohol use: Never    Drug use: Not Currently    Sexual activity: Not Currently     Partners: Male     Review of Systems   Objective:     Vital Signs (Most Recent):  Pulse: 76 (24 1243)  BP: 114/74 (24 1231)  SpO2: 100 % (24 1241) Vital Signs (24h Range):  Temp:  [97.3 °F (36.3 °C)] 97.3 °F (36.3 °C)  Pulse:  [70-86] 76  Resp:  [16] 16  SpO2:  [98 %-100 %] 100 %  BP: (110-114)/(66-74) 114/74     Weight: 61.7 kg (136 lb)  Body mass index is 24.09 kg/m².    FHT: 150Cat 1 (reassuring)  TOCO:  Q 2 minutes    Physical Exam    Cervix:  Dilation:  5  Effacement:  75%  Station: -2  Presentation: Vertex     Significant Labs:  Lab Results   Component Value Date    GROUPTRH A POS 10/09/2024    HEPBSAG Nonreactive 2024    STREPBCULT neg 2022       I have personallly reviewed all pertinent lab results from the last 24 hours.    Assessment/Plan:     33 y.o. female  at 35w2d for:    There are no hospital problems to display for this patient.      Admit for em   Notify nicu    Santo Santiago MD  Obstetrics  [facility] - Labor and Delivery

## 2024-11-05 NOTE — ANESTHESIA PREPROCEDURE EVALUATION
11/05/2024  Barbara Mejia is a 33 y.o., female.      Pre-op Assessment    I have reviewed the Patient Summary Reports.     I have reviewed the Nursing Notes. I have reviewed the NPO Status.   I have reviewed the Medications.     Review of Systems  Neurological:       Seizures           Seizure Disorder                          Psych:  Psychiatric History    Anxiety Disorder and Chronic Anxiety Disorder.               Physical Exam  General: Well nourished, Cooperative and Alert    Airway:  Mallampati: II   Mouth Opening: Normal  TM Distance: Normal  Tongue: Normal    Dental:  Intact    Chest/Lungs:  Normal Respiratory Rate    Heart:  Rate: Normal        Anesthesia Plan  Type of Anesthesia, risks & benefits discussed:    Anesthesia Type: Epidural  Intra-op Monitoring Plan: Standard ASA Monitors  Post Op Pain Control Plan: epidural analgesia  Informed Consent: Informed consent signed with the Patient and all parties understand the risks and agree with anesthesia plan.  All questions answered.   ASA Score: 2  Day of Surgery Review of History & Physical: H&P Update referred to the surgeon/provider.    Ready For Surgery From Anesthesia Perspective.     .

## 2024-11-05 NOTE — ANESTHESIA PROCEDURE NOTES
Epidural    Patient location during procedure: OB   Reason for block: primary anesthetic   Reason for block: labor analgesia requested by patient and obstetrician  Diagnosis: IUP   Start time: 11/5/2024 2:55 PM  Timeout: 11/5/2024 2:55 PM  End time: 11/5/2024 1:05 PM    Staffing  Performing Provider: Marc Rendon MD  Authorizing Provider: Marc Rendon MD    Staffing  Performed by: Marc Rendon MD  Authorized by: Marc Rendon MD        Preanesthetic Checklist  Completed: patient identified, IV checked, site marked, risks and benefits discussed, surgical consent, monitors and equipment checked, pre-op evaluation, timeout performed, anesthesia consent given, hand hygiene performed and patient being monitored  Preparation  Patient position: sitting  Prep: ChloraPrep  Patient monitoring: Pulse Ox  Reason for block: primary anesthetic   Epidural  Skin Anesthetic: lidocaine 1%  Administration type: continuous  Approach: midline  Interspace: L3-4    Injection technique: ARRON saline  Needle and Epidural Catheter  Needle type: Tuohy   Needle gauge: 17  Needle insertion depth: 4 cm  Catheter type: springwound  Catheter size: 19 G  Catheter at skin depth: 9 cm  Insertion Attempts: 1  Test dose: 5 mL of lidocaine 1.5% with Epi 1-to-200,000  Additional Documentation: no paresthesia on injection, no significant pain on injection, no signs/symptoms of IV or SA injection, no significant complaints from patient and negative aspiration for heme and CSF  Needle localization: anatomical landmarks  Assessment  Ease of block: easy  Patient's tolerance of the procedure: comfortable throughout block  Additional Notes  Straightforward epidural, crisp ARRON, easy threading of catheter, negative SA/IV test dose    Justice HERNANDEZ      No inadvertent dural puncture with Tuohy.  Dural puncture not performed with spinal needle

## 2024-11-06 PROCEDURE — 63600175 PHARM REV CODE 636 W HCPCS: Performed by: OBSTETRICS & GYNECOLOGY

## 2024-11-06 PROCEDURE — 90471 IMMUNIZATION ADMIN: CPT | Performed by: OBSTETRICS & GYNECOLOGY

## 2024-11-06 PROCEDURE — 3E02340 INTRODUCTION OF INFLUENZA VACCINE INTO MUSCLE, PERCUTANEOUS APPROACH: ICD-10-PCS | Performed by: OBSTETRICS & GYNECOLOGY

## 2024-11-06 PROCEDURE — 11000001 HC ACUTE MED/SURG PRIVATE ROOM

## 2024-11-06 PROCEDURE — 25000003 PHARM REV CODE 250: Performed by: OBSTETRICS & GYNECOLOGY

## 2024-11-06 PROCEDURE — 90656 IIV3 VACC NO PRSV 0.5 ML IM: CPT | Performed by: OBSTETRICS & GYNECOLOGY

## 2024-11-06 RX ADMIN — BUPRENORPHINE 2 MG: 2 TABLET SUBLINGUAL at 08:11

## 2024-11-06 RX ADMIN — ACETAMINOPHEN 325MG 650 MG: 325 TABLET ORAL at 08:11

## 2024-11-06 RX ADMIN — IBUPROFEN 600 MG: 600 TABLET, FILM COATED ORAL at 12:11

## 2024-11-06 RX ADMIN — IBUPROFEN 600 MG: 600 TABLET, FILM COATED ORAL at 06:11

## 2024-11-06 RX ADMIN — ESCITALOPRAM OXALATE 10 MG: 10 TABLET ORAL at 08:11

## 2024-11-06 RX ADMIN — PRENATAL VITAMINS-IRON FUMARATE 27 MG IRON-FOLIC ACID 0.8 MG TABLET 1 TABLET: at 08:11

## 2024-11-06 RX ADMIN — INFLUENZA VIRUS VACCINE 0.5 ML: 15; 15; 15 SUSPENSION INTRAMUSCULAR at 11:11

## 2024-11-06 RX ADMIN — OXYCODONE AND ACETAMINOPHEN 1 TABLET: 10; 325 TABLET ORAL at 04:11

## 2024-11-06 RX ADMIN — ESCITALOPRAM OXALATE 20 MG: 10 TABLET ORAL at 08:11

## 2024-11-06 RX ADMIN — ACETAMINOPHEN 325MG 650 MG: 325 TABLET ORAL at 12:11

## 2024-11-06 NOTE — PLAN OF CARE
Problem:  Fall Injury Risk  Goal: Absence of Fall, Infant Drop and Related Injury  2024 by Marisol Sher RN  Outcome: Progressing  2024 by Marisol Sher RN  Outcome: Progressing     Problem: Adult Inpatient Plan of Care  Goal: Plan of Care Review  2024 by Marisol Sher RN  Outcome: Progressing  2024 by Marisol Sher RN  Outcome: Progressing  Goal: Patient-Specific Goal (Individualized)  2024 by Marisol Sher RN  Outcome: Progressing  2024 by Marisol Sher RN  Outcome: Progressing  Goal: Absence of Hospital-Acquired Illness or Injury  2024 by Marisol Sher RN  Outcome: Progressing  2024 by Marisol Sher RN  Outcome: Progressing  Goal: Optimal Comfort and Wellbeing  2024 by Marisol Sher RN  Outcome: Progressing  2024 by Marisol Sher RN  Outcome: Progressing  Goal: Readiness for Transition of Care  2024 by Marisol Sher RN  Outcome: Progressing  2024 by Marisol Sher RN  Outcome: Progressing     Problem: Infection  Goal: Absence of Infection Signs and Symptoms  2024 by Marisol Sher RN  Outcome: Progressing  2024 by Marisol Sher RN  Outcome: Progressing     Problem: Postpartum (Vaginal Delivery)  Goal: Successful Parent Role Transition  Outcome: Progressing  Goal: Hemostasis  Outcome: Progressing  Goal: Absence of Infection Signs and Symptoms  Outcome: Progressing  Goal: Anesthesia/Sedation Recovery  Outcome: Progressing  Goal: Optimal Pain Control and Function  Outcome: Progressing  Goal: Effective Urinary Elimination  Outcome: Progressing

## 2024-11-06 NOTE — ANESTHESIA POSTPROCEDURE EVALUATION
Anesthesia Post Evaluation    Patient: Barbara Mejia    Procedure(s) Performed: * No procedures listed *    Final Anesthesia Type: epidural      Patient location during evaluation: floor  Patient participation: Yes- Able to Participate  Level of consciousness: awake and oriented  Post-procedure vital signs: reviewed and stable  Pain management: adequate  Airway patency: patent    PONV status at discharge: No PONV  Anesthetic complications: no      Cardiovascular status: stable and hemodynamically stable  Respiratory status: unassisted and spontaneous ventilation  Hydration status: euvolemic  Follow-up not needed.        Neuraxial Block resolved, Sensory & Motor intact, denies HA      Vitals Value Taken Time   /70 11/06/24 0707   Temp 36.6 °C (97.9 °F) 11/06/24 0707   Pulse 53 11/06/24 0707   Resp 18 11/06/24 0420   SpO2 98 % 11/06/24 0707         No case tracking events are documented in the log.      Pain/Dat Score: Pain Rating Prior to Med Admin: 3 (11/6/2024  6:00 AM)

## 2024-11-06 NOTE — PROGRESS NOTES
[facility] - 2nd Floor Mother/Baby Unit  Obstetrics  Postpartum Progress Note    Patient Name: Barbara Mejia  MRN: [id number]  Admission Date: 2024  Hospital Length of Stay: 1 days  Attending Physician: Santo Santiago MD  Primary Care Provider: Martínez Burr III, APRN-CNP    Subjective:     Principal Problem:pp1    Hospital course: gra8    Interval History:    She is doing well this morning. She is tolerating a regular diet without nausea or vomiting. She is voiding spontaneously. She is ambulating. She has passed flatus, and has a BM. Vaginal bleeding is mild. She denies fever or chills. Abdominal pain is mild and controlled with oral medications. She Is not breastfeeding. She desires circumcision for her male baby: yes.     Objective:     Vital Signs (Most Recent):  Temp: 97.9 °F (36.6 °C) (24)  Pulse: (!) 53 (24)  Resp: 18 (240)  BP: 117/70 (24)  SpO2: 98 % (24) Vital Signs (24h Range):  Temp:  [97.9 °F (36.6 °C)-98.3 °F (36.8 °C)] 97.9 °F (36.6 °C)  Pulse:  [53-99] 53  Resp:  [16-18] 18  SpO2:  [90 %-100 %] 98 %  BP: ()/(49-78) 117/70     Weight: 61.7 kg (136 lb)  Body mass index is 24.09 kg/m².      Intake/Output Summary (Last 24 hours) at 2024 0723  Last data filed at 2024 1625  Gross per 24 hour   Intake --   Output 285 ml   Net -285 ml       Physical Exam    Significant Labs:  Lab Results   Component Value Date    GROUPTRH A POS 2024    HEPBSAG Nonreactive 2024    STREPBCULT neg 2022     Recent Labs   Lab 24  1311   HGB 11.6*   HCT 34.5*       I have personallly reviewed all pertinent lab results from the last 24 hours.    Assessment/Plan:     33 y.o. female  at 35w2d for:    There are no hospital problems to display for this patient.      Cw pp care    Disposition: As patient meets milestones, will plan to discharge 2moro.    Santo Santiago MD  Obstetrics  [facility] - 2nd Floor Mother/Baby Unit

## 2024-11-06 NOTE — PROGRESS NOTES
Copied from baby's chart:    .. Admit Assessment    Patient Identification  Erin Mejia   :  [birth year]24  Admit Date:  2024  Attending Provider:  Samuel Deleon Jr.,*              Referral:   Pt was admitted to NICU with a diagnosis of <principal problem not specified>, and was admitted this hospital stay due to Single liveborn infant [Z38.2].   is involved and patient was referred to the Social Work Department via Routine NICU consult.        Verified her face sheet information:      Living Situation:      Resides at [address] [place] [address], phone: [phone]       Met with mother, alone, in her postpartum room to complete new NICU admit social assessment. Mother was easily engaged in assessment and appropriate throughout, mother showed appropriate concern and emotion regarding baby's status. Baby's Name: Sarah Mejia. FOB: Cydney Ashley (involved, not on BC). Mother reports to living at above confirmed address with her mother (Brennen Hall [phone]) and her daughters: 12y 10y 6y 4y 2y. Provided mother with \[facility] [place] [facility]\""W contact info along with parent resource packet.       OB: Dr Santo Lisa: Dr Ana Felder     Confirmed Supplies: confirmed to having a carseat and safe sleep     History/Current Symptoms of Anxiety/Depression: mother reports a hx of Bipolar Depression, per chart also hx of schizophrenia but mother denied such. Mother reports being prescribed Lexapro by Dr Nicky Gomez (PMHNP, DNP) and mother reports that this regimen is working for her currently   Discussed PPD and identifying symptoms and provided mom with PPD counseling resources and symptom brochure.       Identified Support:  mother reports to having a good support system      History/Current Substance Use:  mother reports a hx of pain medication abuse. She reports last abusing such approx a year ago prior to initiating subutex. Mother reports prescribed  subutex by Dr Nicky Gomez      Indications of Abuse/Neglect:   no indications         Emotional/Behavioral/Cognitive Issues: none observed during assessment      Current RX Prescriptions: lexapro, subutex      Adequate Discharge/Visiting Transportation: yes     ORLIN Signed/Filed: n/a     Qualifies:   Early steps: no  SSI: no     NICU Assessment completed in Flowsheets: yes      Mother's UDS: Negative on admit    Baby's UDS: none collected    MDS: pending            Plan: will follow MDS results and make DCFS report for any nonprescribed positive substance, will follow family throughout baby's stay in NICU for any needs         24 1155   NICU Assessment   Assessment Type Discharge Planning Assessment   Source of Information family   Verified Demographic and Insurance Information Yes   Insurance Medicaid   Lives With mother;grandmother;sister   Name(s) of People in Home Ben Foleybessie Hall (maternal grandmother), 12yfemale, 10yfemale, 6yfemale, 4yfemale, 2yfemale   Number people in home 8 includign baby   Primary Source of Support/Comfort parent   Other children (include names and ages) 12yfemale, 10yfemale, 6yfemale, 4yfemale, 2yfemale   Father's Involvement Fully Involved   Is Father signing the birth certificate No   Father Name and  Cydney Ashley   Family Involvement Moderate   Primary Contact Name and Number Barbara Mejia [phone]   Other Contacts Names and Numbers Brennen Hall (maternal grandmother) [phone]   Infant Feeding Plan breastfeeding;formula feeding   Does baby have crib or safe sleep space? Yes   Do you have a car seat? Yes   Resource/Environmental Concerns none   Environment Concerns none   Resources/Education Provided Medicaid transportation;Preparing for Your Baby's Discharge Home;Support Resources for NICU Families;Post Partum Depression   DME Needed Upon Discharge  none   DCFS No indications (Indicators for Report)   Discharge Plan A Home with family

## 2024-11-07 VITALS
OXYGEN SATURATION: 98 % | WEIGHT: 136 LBS | DIASTOLIC BLOOD PRESSURE: 67 MMHG | BODY MASS INDEX: 24.1 KG/M2 | TEMPERATURE: 98 F | HEIGHT: 63 IN | SYSTOLIC BLOOD PRESSURE: 105 MMHG | HEART RATE: 66 BPM | RESPIRATION RATE: 18 BRPM

## 2024-11-07 PROBLEM — O32.6XX0: Status: ACTIVE | Noted: 2024-11-07

## 2024-11-07 PROCEDURE — 25000003 PHARM REV CODE 250: Performed by: OBSTETRICS & GYNECOLOGY

## 2024-11-07 RX ORDER — MEDROXYPROGESTERONE ACETATE 150 MG/ML
150 INJECTION, SUSPENSION INTRAMUSCULAR ONCE
Status: COMPLETED | OUTPATIENT
Start: 2024-11-07 | End: 2024-11-07

## 2024-11-07 RX ORDER — IBUPROFEN 600 MG/1
600 TABLET ORAL EVERY 6 HOURS PRN
Qty: 28 TABLET | Refills: 0 | Status: SHIPPED | OUTPATIENT
Start: 2024-11-07

## 2024-11-07 RX ADMIN — IBUPROFEN 600 MG: 600 TABLET, FILM COATED ORAL at 12:11

## 2024-11-07 RX ADMIN — MEDROXYPROGESTERONE ACETATE 150 MG: 150 INJECTION, SUSPENSION INTRAMUSCULAR at 09:11

## 2024-11-07 RX ADMIN — BUPRENORPHINE 2 MG: 2 TABLET SUBLINGUAL at 08:11

## 2024-11-07 RX ADMIN — ESCITALOPRAM OXALATE 10 MG: 10 TABLET ORAL at 08:11

## 2024-11-07 RX ADMIN — IBUPROFEN 600 MG: 600 TABLET, FILM COATED ORAL at 08:11

## 2024-11-07 NOTE — PROGRESS NOTES
Pt seen this am no  complaints  Min lochia  Walking to nicue to see baby wants to go home    Sp  pp2   Depo  dc

## 2024-11-07 NOTE — DISCHARGE SUMMARY
"[facility] - 2nd Floor Mother/Baby Unit  Obstetrics  Discharge Summary      Patient Name: Barbara Mejia  MRN: [id number]  Admission Date: 2024  Hospital Length of Stay: 2 days  Discharge Date and Time:  2024 7:27 AM  Attending Physician: Santo Santiago MD   Discharging Provider: Santo Santiago MD  Primary Care Provider: Martínez Burr III, APRN-CNP    HPI:      * No surgery found *     Hospital Course: stable        Final Active Diagnoses:    Diagnosis Date Noted POA    PRINCIPAL PROBLEM:  Compound presentation of fetus palpable vaginally [O32.6XX0] 2024 Unknown      Problems Resolved During this Admission:        Labs: All labs within the past 24 hours have been reviewed    Feeding Method: both breast and bottle    Immunizations       Date Immunization Status Dose Route/Site Given by    24 1731 MMR Incomplete 0.5 mL Subcutaneous/     24 1731 Tdap Incomplete 0.5 mL Intramuscular/     24 1106 Influenza - Trivalent - Fluarix, Flulaval, Fluzone, Afluria - PF Deleted 0.5 mL Intramuscular/     24 1105 Influenza - Trivalent - Fluarix, Flulaval, Fluzone, Afluria - PF Given 0.5 mL Intramuscular/Right deltoid Marisol Sher RN            Delivery:    Episiotomy: None   Lacerations: None   Repair suture: None   Repair # of packets:     Blood loss (ml):       Birth information:  YOB: 2023   Time of birth: 4:01 PM   Sex: female   Delivery type: Vaginal, Spontaneous   Gestational Age: 35w2d     Measurements    Weight: 2450 g  Weight (lbs): 5 lb 6.4 oz  Length: 45.7 cm  Length (in): 18"  Head circumference: 30.5 cm         Delivery Clinician: Delivery Providers    Delivering clinician: Santo Santiago MD   Provider Role    Heydi Dubois RN Registered Nurse    Gillian Marion RN Registered Nurse    Minan Carr RN Registered Nurse    Allyson Gomez RN NICU Davis, Hillary, RN NICU             Additional  information:  Forceps:    Vacuum:  "   Breech:    Observed anomalies      Living?:     Apgars    Living status: Living  Apgar Component Scores:  1 min.:  5 min.:  10 min.:  15 min.:  20 min.:    Skin color:  1  1       Heart rate:  2  2       Reflex irritability:  2  2       Muscle tone:  2  2       Respiratory effort:  2  2       Total:  9  9       Apgars assigned by: CRISTELANICU RN         Placenta: Delivered:       appearance  Pending Diagnostic Studies:       None            Discharged Condition: stable    Disposition: Home or Self Care    Follow Up:    Patient Instructions:   No discharge procedures on file.  Medications:  Current Discharge Medication List        START taking these medications    Details   ibuprofen (ADVIL,MOTRIN) 600 MG tablet Take 1 tablet (600 mg total) by mouth every 6 (six) hours as needed (cramping).  Qty: 28 tablet, Refills: 0           CONTINUE these medications which have NOT CHANGED    Details   aspirin (ECOTRIN) 81 MG EC tablet Take 1 tablet (81 mg total) by mouth once daily.  Qty: 30 tablet, Refills: 5      buprenorphine HCL (SUBUTEX) 8 mg Subl SMARTSI.5 Tablet(s) Sublingual Daily      !! EScitalopram oxalate (LEXAPRO) 10 MG tablet Take 10 mg by mouth.      !! EScitalopram oxalate (LEXAPRO) 20 MG tablet Take 20 mg by mouth.      NIFEdipine (PROCARDIA) 10 MG Cap Take 1 capsule (10 mg total) by mouth every 6 (six) hours as needed (contractions).  Qty: 30 capsule, Refills: 0      PNV,calcium 72-iron,carb-folic (PRENATAL PLUS) 29 mg iron- 1 mg Tab Take 1 tablet by mouth once daily.  Qty: 30 tablet, Refills: 11    Comments: Okay to change the brand if not avail or if not covered  Associated Diagnoses: Supervision of normal intrauterine pregnancy in multigravida, first trimester; Encntr for obs for oth suspected diseases and cond ruled out      progesterone (PROMETRIUM) 200 MG capsule Place 1 capsule (200 mg total) vaginally nightly.  Qty: 30 capsule, Refills: 4       !! - Potential duplicate medications found. Please  discuss with provider.          Santo Santiago MD  Obstetrics  [facility] - 2nd Floor Mother/Baby Unit